# Patient Record
Sex: MALE | Race: WHITE | NOT HISPANIC OR LATINO | Employment: UNEMPLOYED | URBAN - NONMETROPOLITAN AREA
[De-identification: names, ages, dates, MRNs, and addresses within clinical notes are randomized per-mention and may not be internally consistent; named-entity substitution may affect disease eponyms.]

---

## 2017-01-19 ENCOUNTER — OFFICE VISIT (OUTPATIENT)
Dept: ORTHOPEDIC SURGERY | Facility: CLINIC | Age: 47
End: 2017-01-19

## 2017-01-19 VITALS
SYSTOLIC BLOOD PRESSURE: 124 MMHG | BODY MASS INDEX: 33.53 KG/M2 | DIASTOLIC BLOOD PRESSURE: 78 MMHG | WEIGHT: 253 LBS | HEIGHT: 73 IN

## 2017-01-19 DIAGNOSIS — S43.52XD ACROMIOCLAVICULAR SPRAIN, LEFT, SUBSEQUENT ENCOUNTER: Primary | ICD-10-CM

## 2017-01-19 PROCEDURE — 99213 OFFICE O/P EST LOW 20 MIN: CPT | Performed by: ORTHOPAEDIC SURGERY

## 2017-01-19 RX ORDER — MELOXICAM 15 MG/1
15 TABLET ORAL DAILY
Qty: 30 TABLET | Refills: 0 | Status: SHIPPED | OUTPATIENT
Start: 2017-01-19 | End: 2021-07-23

## 2017-01-19 NOTE — PROGRESS NOTES
Subjective   Jeremy Siu is a 46 y.o. male. 1970- 4 week recheck- left shoulder      History of Present Illness   Patient is here for a 4 week recheck of the left shoulder. Patient states that he has continued to have moderate pain to his left shoulder, but he does believe the kenalog/marcaine shot has relieved some of the pain. The patient has requested a refill request for Meloxicam 15mg to be sent to his pharmacy of choice. Due to the patient's daily obligations, the patient could not attend physical therapy sessions. The patient states that he does perform at home exercises to his left shoulder.     The following portions of the patient's history were reviewed and updated as appropriate:   He  has a past medical history of Diabetes mellitus due to underlying condition with diabetic neuropathic arthropathy; Diabetic neuropathy; Male erectile disorder; Myopia; Tobacco dependence syndrome; and Type 2 diabetes mellitus without complications.  He  does not have any pertinent problems on file.  He  has no past surgical history on file.  His Family history is unknown by patient.  He  reports that he has been smoking Cigarettes.  He has never used smokeless tobacco. He reports that he does not drink alcohol or use illicit drugs.  Current Outpatient Prescriptions   Medication Sig Dispense Refill   • amitriptyline (ELAVIL) 10 MG tablet Take 10 mg by mouth Every Night.     • aspirin 81 MG EC tablet Take 81 mg by mouth Daily.     • cyclobenzaprine (FLEXERIL) 10 MG tablet Take 1 tablet by mouth 3 (Three) Times a Day As Needed for muscle spasms. 90 tablet 3   • gabapentin (NEURONTIN) 300 MG capsule Take 300 mg by mouth 3 (Three) Times a Day. 1 CAP BID AND 2 CAPS BED     • linagliptin (TRADJENTA) 5 MG tablet tablet Take 5 mg by mouth Daily.     • meloxicam (MOBIC) 15 MG tablet Take 1 tablet by mouth Daily for 30 days. 30 tablet 0   • metFORMIN (GLUCOPHAGE) 1000 MG tablet Take 1,000 mg by mouth 2 (Two) Times a Day  With Meals.     • naproxen (NAPROSYN) 500 MG tablet Take 1 tablet by mouth 2 (Two) Times a Day With Meals. 60 tablet 1   • Omega-3 Fatty Acids (OMEGA 3 PO) Take  by mouth.     • pravastatin (PRAVACHOL) 20 MG tablet Take 20 mg by mouth Daily.     • sildenafil (VIAGRA) 25 MG tablet Take 25 mg by mouth Daily As Needed for erectile dysfunction.       No current facility-administered medications for this visit.      Current Outpatient Prescriptions on File Prior to Visit   Medication Sig   • amitriptyline (ELAVIL) 10 MG tablet Take 10 mg by mouth Every Night.   • aspirin 81 MG EC tablet Take 81 mg by mouth Daily.   • cyclobenzaprine (FLEXERIL) 10 MG tablet Take 1 tablet by mouth 3 (Three) Times a Day As Needed for muscle spasms.   • gabapentin (NEURONTIN) 300 MG capsule Take 300 mg by mouth 3 (Three) Times a Day. 1 CAP BID AND 2 CAPS BED   • linagliptin (TRADJENTA) 5 MG tablet tablet Take 5 mg by mouth Daily.   • metFORMIN (GLUCOPHAGE) 1000 MG tablet Take 1,000 mg by mouth 2 (Two) Times a Day With Meals.   • naproxen (NAPROSYN) 500 MG tablet Take 1 tablet by mouth 2 (Two) Times a Day With Meals.   • Omega-3 Fatty Acids (OMEGA 3 PO) Take  by mouth.   • pravastatin (PRAVACHOL) 20 MG tablet Take 20 mg by mouth Daily.   • sildenafil (VIAGRA) 25 MG tablet Take 25 mg by mouth Daily As Needed for erectile dysfunction.     No current facility-administered medications on file prior to visit.      He has No Known Allergies..    Review of Systems  REVIEW OF SYSTEMS:  Negative, other than presenting complaint.  HEENT: No headaches, diplopia, blurred vision, tinnitus, vertigo, epistaxis, hoarseness or sore throat.  Pulmonary: No cough, sputum, hemoptysis, dyspnea, wheezing, or chest pain.  Cardiac: No chest pain, palpitations, orthopnea, paroxysmal nocturnal dyspnea, shortness of breath, or pedal edema.  Gastrointestinal: No diarrhea, melena, or constipation.  Genitourinary: No dysuria, hematuria, nocturia, frequency, bladder or  "bowel incontinence.  Hematology: No history of any anemia, fatigue, fever, or chills or night sweats.  Dermatology: No rashes, pruritus, or increased pigmentation changes of the skin.     Objective   Physical Exam  Visit Vitals   • /78 (BP Location: Right arm, Patient Position: Sitting)   • Ht 73\" (185.4 cm)   • Wt 253 lb (115 kg)   • BMI 33.38 kg/m2       Social History     Social History   • Marital status:      Spouse name: N/A   • Number of children: N/A   • Years of education: N/A     Occupational History   • Not on file.     Social History Main Topics   • Smoking status: Current Every Day Smoker     Types: Cigarettes   • Smokeless tobacco: Never Used   • Alcohol use No   • Drug use: No   • Sexual activity: Yes     Other Topics Concern   • Not on file     Social History Narrative       HEENT: Normocephalic.  PERRLA.  TM's clear bilaterally.  Oropharynx: Clear.  Neck: Supple, with no adenopathy.  Chest: Equal bilateral expansion.  Clear to auscultation and percussion.  Heart: Regular sinus rhythm, S1 and S2 normal.  No murmurs or extra heart sounds heard.  Abdomen: Soft, nontender, and no organomegaly.  Neurological: cranial nerves II-XII normal Vascular: pulses are present  Dermatological: no rashes  or blemishes, or any abnormality of the skin.  Exam shows abduction to 130, internal rotation to L1 external rotation to 65 degrees. Biceps, triceps, supraspinatus all 4+/5.neurointact.    A prescription for Meloxicam 15mg will be sent to the patients pharmacy of choice. Pateint was instructed to continue to perform at home exercises to increase range of motion to the left shoulder.    Assessment/Plan impingement syndrome , improving , see back in 6 weeks.    Problems Addressed this Visit        Musculoskeletal and Integument    Acromioclavicular sprain - Primary    Relevant Medications    meloxicam (MOBIC) 15 MG tablet                 "

## 2017-01-19 NOTE — MR AVS SNAPSHOT
Jeremy Siu   1/19/2017 10:30 AM   Office Visit    Dept Phone:  [phone]   Encounter #:  [id number]    Provider:  Micheal Evans MD   Department:  [facility] ORTHOPEDICS                Your Full Care Plan              Today's Medication Changes          These changes are accurate as of: 1/19/17 10:38 AM.  If you have any questions, ask your nurse or doctor.               New Medication(s)Ordered:     meloxicam 15 MG tablet   Commonly known as:  MOBIC   Take 1 tablet by mouth Daily for 30 days.   Started by:  Micheal Evans MD            Where to Get Your Medications      These medications were sent to [facility], [facility] - [place], [place] - [address] - [phone]  - [phone] FX  [address] [facility] [address]     Phone:  [phone]     meloxicam 15 MG tablet                  Your Updated Medication List          This list is accurate as of: 1/19/17 10:38 AM.  Always use your most recent med list.                amitriptyline 10 MG tablet   Commonly known as:  ELAVIL       aspirin 81 MG EC tablet       cyclobenzaprine 10 MG tablet   Commonly known as:  FLEXERIL   Take 1 tablet by mouth 3 (Three) Times a Day As Needed for muscle spasms.       gabapentin 300 MG capsule   Commonly known as:  NEURONTIN       linagliptin 5 MG tablet tablet   Commonly known as:  TRADJENTA       meloxicam 15 MG tablet   Commonly known as:  MOBIC   Take 1 tablet by mouth Daily for 30 days.       metFORMIN 1000 MG tablet   Commonly known as:  GLUCOPHAGE       naproxen 500 MG tablet   Commonly known as:  NAPROSYN   Take 1 tablet by mouth 2 (Two) Times a Day With Meals.       OMEGA 3 PO       pravastatin 20 MG tablet   Commonly known as:  PRAVACHOL       VIAGRA 25 MG tablet   Generic drug:  sildenafil               You Were Diagnosed With        Codes Comments    Acromioclavicular sprain, left, subsequent encounter    -  Primary ICD-10-CM:  "S43.52XD  ICD-9-CM: V58.89, 840.0       Instructions     None    Patient Instructions History      Upcoming Appointments     Visit Type Date Time Department    FOLLOW UP 1/19/2017 10:30 AM [facility]    FOLLOW UP 3/16/2017  9:45 AM [facility] Sign[facility]     Our records indicate that you have declined [facility] signup. If you would like to sign up for [facility], please email [email or web address] or call [phone] to obtain an activation code.             Other Info from Your Visit           Your Appointments     Mar 16, 2017  9:45 AM CDT   Follow Up with Micheal Evans MD   [facility] ORTHOPEDICS (--)    [address]  [facility] [place] [address] [place] [id number] [phone]           Arrive 15 minutes prior to appointment.              Other Notes About Your Plan     Risk score 3    Blue team        Allergies     No Known Allergies      Reason for Visit     Left Shoulder - Follow-up           Vital Signs     Blood Pressure Height Weight Body Mass Index Smoking Status       124/78 (BP Location: Right arm, Patient Position: Sitting) 73\" (185.4 cm) 253 lb (115 kg) 33.38 kg/m2 Current Every Day Smoker       Problems and Diagnoses Noted     Sprain and strain of shoulder and upper arm        "

## 2017-03-03 RX ORDER — GABAPENTIN 300 MG/1
300 CAPSULE ORAL 3 TIMES DAILY
Qty: 90 CAPSULE | Refills: 5 | Status: SHIPPED | OUTPATIENT
Start: 2017-03-03 | End: 2020-06-24

## 2017-03-03 RX ORDER — GABAPENTIN 300 MG/1
300 CAPSULE ORAL 3 TIMES DAILY
Qty: 90 CAPSULE | Refills: 5 | Status: SHIPPED | OUTPATIENT
Start: 2017-03-03 | End: 2017-03-03 | Stop reason: SDUPTHER

## 2018-12-16 ENCOUNTER — HOSPITAL ENCOUNTER (EMERGENCY)
Facility: HOSPITAL | Age: 48
Discharge: HOME | End: 2018-12-16
Attending: EMERGENCY MEDICINE | Admitting: EMERGENCY MEDICINE

## 2018-12-16 ENCOUNTER — APPOINTMENT (OUTPATIENT)
Dept: GENERAL RADIOLOGY | Facility: HOSPITAL | Age: 48
End: 2018-12-16

## 2018-12-16 VITALS
HEIGHT: 73 IN | BODY MASS INDEX: 36.94 KG/M2 | WEIGHT: 278.7 LBS | TEMPERATURE: 98.2 F | DIASTOLIC BLOOD PRESSURE: 78 MMHG | HEART RATE: 70 BPM | SYSTOLIC BLOOD PRESSURE: 130 MMHG | RESPIRATION RATE: 20 BRPM | OXYGEN SATURATION: 93 %

## 2018-12-16 DIAGNOSIS — M25.511 ACUTE PAIN OF RIGHT SHOULDER: Primary | ICD-10-CM

## 2018-12-16 DIAGNOSIS — M25.561 ACUTE PAIN OF RIGHT KNEE: ICD-10-CM
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
## 2018-12-16 PROCEDURE — 73080 X-RAY EXAM OF ELBOW: CPT

## 2018-12-16 PROCEDURE — 73030 X-RAY EXAM OF SHOULDER: CPT

## 2018-12-16 PROCEDURE — 73090 X-RAY EXAM OF FOREARM: CPT

## 2018-12-16 PROCEDURE — 99283 EMERGENCY DEPT VISIT LOW MDM: CPT

## 2018-12-16 PROCEDURE — 73564 X-RAY EXAM KNEE 4 OR MORE: CPT

## 2018-12-16 RX ORDER — OMEPRAZOLE 20 MG/1
20 CAPSULE, DELAYED RELEASE ORAL DAILY
COMMUNITY

## 2018-12-16 RX ORDER — ATORVASTATIN CALCIUM 40 MG/1
40 TABLET, FILM COATED ORAL DAILY
COMMUNITY

## 2018-12-16 RX ORDER — OMEGA-3-ACID ETHYL ESTERS 1 G/1
2 CAPSULE, LIQUID FILLED ORAL 2 TIMES DAILY
COMMUNITY
End: 2020-06-24

## 2018-12-16 RX ORDER — GLIPIZIDE 10 MG/1
20 TABLET ORAL DAILY
COMMUNITY
End: 2020-07-08

## 2018-12-16 RX ORDER — LISINOPRIL 5 MG/1
5 TABLET ORAL DAILY
COMMUNITY

## 2018-12-17 ENCOUNTER — OFFICE VISIT (OUTPATIENT)
Dept: ORTHOPEDIC SURGERY | Facility: CLINIC | Age: 48
End: 2018-12-17

## 2018-12-17 VITALS — BODY MASS INDEX: 36.98 KG/M2 | HEIGHT: 73 IN | WEIGHT: 279 LBS

## 2018-12-17 DIAGNOSIS — S42.151A GLENOID FRACTURE OF SHOULDER, RIGHT, CLOSED, INITIAL ENCOUNTER: Primary | ICD-10-CM

## 2018-12-17 DIAGNOSIS — S42.141A GLENOID FRACTURE OF SHOULDER, RIGHT, CLOSED, INITIAL ENCOUNTER: Primary | ICD-10-CM

## 2018-12-17 DIAGNOSIS — M25.511 ACUTE PAIN OF RIGHT SHOULDER: ICD-10-CM
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
## 2018-12-17 DIAGNOSIS — S80.01XA CONTUSION OF RIGHT KNEE, INITIAL ENCOUNTER: ICD-10-CM

## 2018-12-17 PROCEDURE — 99214 OFFICE O/P EST MOD 30 MIN: CPT | Performed by: NURSE PRACTITIONER

## 2018-12-17 PROCEDURE — 23570 CLTX SCAPULAR FX W/O MNPJ: CPT | Performed by: NURSE PRACTITIONER

## 2018-12-17 RX ORDER — IBUPROFEN 800 MG/1
800 TABLET ORAL EVERY 8 HOURS PRN
Qty: 90 TABLET | Refills: 1 | Status: SHIPPED | OUTPATIENT
Start: 2018-12-17 | End: 2019-01-16

## 2018-12-17 RX ORDER — TRAMADOL HYDROCHLORIDE 50 MG/1
TABLET ORAL
Qty: 40 TABLET | Refills: 0 | Status: SHIPPED | OUTPATIENT
Start: 2018-12-17 | End: 2019-05-06

## 2018-12-17 RX ORDER — AMITRIPTYLINE HYDROCHLORIDE 10 MG/1
10 TABLET, FILM COATED ORAL NIGHTLY
COMMUNITY

## 2018-12-17 NOTE — PROGRESS NOTES
Jeremy Siu is a 48 y.o. male   Primary provider:  Gina Castillo APRN       Chief Complaint   Patient presents with   • Right Shoulder - Shoulder Pain       HISTORY OF PRESENT ILLNESS:    48-year-old  male who is a  at a local apartments fell into a manhole while walking in the [facility] on December 16, 2018.  He was evaluated by her local urgent care/ER and referred to us for further evaluations of a avulsion fracture of the glenoid of the right shoulder.  He's also complaining of right knee and arm pain after the incident.  He was evaluated and released placed in a sling for the injury of the right shoulder and given medication for his discomfort.  He reports continued pain in the right shoulder without improvement despite use of the sling and anti-inflammatories/pain medication.    Arm Injury    The incident occurred 12 to 24 hours ago. The injury mechanism was a fall. The pain is present in the right shoulder. The quality of the pain is described as aching and stabbing. The pain is severe. The pain has been constant since the incident. Associated symptoms comments: Swelling. .        CONCURRENT MEDICAL HISTORY:    Past Medical History:   Diagnosis Date   • Diabetes mellitus due to underlying condition with diabetic neuropathic arthropathy (CMS/HCC)    • Diabetic neuropathy (CMS/HCC)    • Male erectile disorder    • Myopia    • Tobacco dependence syndrome    • Type 2 diabetes mellitus without complications (CMS/HCC)        No Known Allergies      Current Outpatient Medications:   •  amitriptyline (ELAVIL) 10 MG tablet, Take 10 mg by mouth Every Night., Disp: , Rfl:   •  aspirin 81 MG EC tablet, Take 81 mg by mouth Daily., Disp: , Rfl:   •  atorvastatin (LIPITOR) 40 MG tablet, Take 40 mg by mouth Daily., Disp: , Rfl:   •  cyclobenzaprine (FLEXERIL) 10 MG tablet, Take 1 tablet by mouth 3 (Three) Times a Day As Needed for muscle spasms., Disp: 90 tablet, Rfl: 3  •   gabapentin (NEURONTIN) 300 MG capsule, Take 1 capsule by mouth 3 (Three) Times a Day. 1 CAP IN THE MORNING AND 2 CAPS BED (Patient taking differently: Take 600 mg by mouth 3 (Three) Times a Day. 1 CAP IN THE MORNING AND 2 CAPS BED), Disp: 90 capsule, Rfl: 5  •  glipiZIDE (GLUCOTROL) 10 MG tablet, Take 10 mg by mouth Daily., Disp: , Rfl:   •  linagliptin (TRADJENTA) 5 MG tablet tablet, Take 1 tablet by mouth Daily., Disp: 30 tablet, Rfl: 5  •  lisinopril (PRINIVIL,ZESTRIL) 5 MG tablet, Take 5 mg by mouth Daily., Disp: , Rfl:   •  metFORMIN (GLUCOPHAGE) 1000 MG tablet, Take 1 tablet by mouth 2 (Two) Times a Day With Meals., Disp: 60 tablet, Rfl: 5  •  omega-3 acid ethyl esters (LOVAZA) 1 g capsule, Take 2 g by mouth 2 (Two) Times a Day., Disp: , Rfl:   •  Omega-3 Fatty Acids (OMEGA 3 PO), Take  by mouth., Disp: , Rfl:   •  omeprazole (priLOSEC) 20 MG capsule, Take 20 mg by mouth Daily., Disp: , Rfl:   •  pravastatin (PRAVACHOL) 20 MG tablet, Take 20 mg by mouth Daily., Disp: , Rfl:   •  ibuprofen (ADVIL,MOTRIN) 800 MG tablet, Take 1 tablet by mouth Every 8 (Eight) Hours As Needed for Mild Pain  for up to 30 days., Disp: 90 tablet, Rfl: 1  •  meloxicam (MOBIC) 15 MG tablet, Take 1 tablet by mouth Daily for 30 days., Disp: 30 tablet, Rfl: 0  •  traMADol (ULTRAM) 50 MG tablet, 1-2 tabs po q eight hour prn pain, Disp: 40 tablet, Rfl: 0    No past surgical history on file.    Family History   Family history unknown: Yes        Social History     Socioeconomic History   • Marital status:      Spouse name: Not on file   • Number of children: Not on file   • Years of education: Not on file   • Highest education level: Not on file   Social Needs   • Financial resource strain: Not on file   • Food insecurity - worry: Not on file   • Food insecurity - inability: Not on file   • Transportation needs - medical: Not on file   • Transportation needs - non-medical: Not on file   Occupational History   • Not on file   Tobacco  "Use   • Smoking status: Current Every Day Smoker     Types: Cigarettes   • Smokeless tobacco: Never Used   Substance and Sexual Activity   • Alcohol use: No   • Drug use: No   • Sexual activity: Yes   Other Topics Concern   • Not on file   Social History Narrative   • Not on file        Review of Systems   Psychiatric/Behavioral: Positive for sleep disturbance.   All other systems reviewed and are negative.      PHYSICAL EXAMINATION:       Ht 185.4 cm (73\")   Wt 127 kg (279 lb)   BMI 36.81 kg/m²     Physical Exam   Constitutional: He is oriented to person, place, and time. Vital signs are normal. He appears well-developed and well-nourished. He is cooperative.   HENT:   Head: Normocephalic and atraumatic.   Neck: Trachea normal and phonation normal.   Pulmonary/Chest: Effort normal. No respiratory distress.   Abdominal: Soft. Normal appearance. He exhibits no distension.   Musculoskeletal:        Right knee: He exhibits no effusion.   Neurological: He is alert and oriented to person, place, and time. GCS eye subscore is 4. GCS verbal subscore is 5. GCS motor subscore is 6.   Skin: Skin is warm, dry and intact. Capillary refill takes less than 2 seconds.   Psychiatric: He has a normal mood and affect. His speech is normal and behavior is normal. Judgment and thought content normal. Cognition and memory are normal.   Vitals reviewed.      GAIT:     [x]  Normal  []  Antalgic    Assistive device: [x]  None  []  Walker     []  Crutches  []  Cane     []  Wheelchair  []  Stretcher    Right Knee Exam     Tenderness   The patient is experiencing tenderness in the patellar tendon, medial joint line and lateral joint line.    Range of Motion   Extension: normal   Flexion: normal     Other   Erythema: absent  Sensation: normal  Pulse: present  Swelling: mild  Effusion: no effusion present    Comments:  Small abrasion noted anterior knee no evidence of infection      Left Knee Exam   Left knee exam is normal.    Muscle " Strength   The patient has normal left knee strength.      Right Shoulder Exam     Tenderness   Right shoulder tenderness location: Diffuse pain.    Other   Erythema: absent  Scars: absent  Sensation: normal  Pulse: present    Comments:  Range of motion and strength testing deferred due to the glenoid fracture.      Left Shoulder Exam   Left shoulder exam is normal.              Xr Shoulder 2+ View Right    Result Date: 12/16/2018  Narrative: Three view right shoulder HISTORY: Right shoulder pain after falling AP films with the humerus in internal and external rotation and scapular Y view were obtained. COMPARISON: None 3 mm avulsion fracture adjacent to the inferior aspect of the glenoid, of uncertain age. Minimal hypertrophic change acromioclavicular joint. No dislocation. Degenerative changes thoracic spine. No other osseous or articular abnormality.     Impression: CONCLUSION: 3 mm avulsion fracture adjacent to the inferior aspect of the glenoid, of uncertain age. Minimal hypertrophic change acromioclavicular joint. [id number] Electronically signed by:  Don Torres MD  12/16/2018 8:58 PM CST Workstation: [id number]    Xr Elbow 3+ View Right    Result Date: 12/16/2018  Narrative: Three view right elbow HISTORY: Pain after falling AP, lateral and oblique views obtained. COMPARISON: None No fracture or dislocation. Small to moderate-sized olecranon spur. No joint effusion. No other osseous or articular abnormality.     Impression: CONCLUSION: No fracture or dislocation. Small to moderate-sized olecranon spur. No joint effusion. [id number] Electronically signed by:  Don Torres MD  12/16/2018 8:52 PM CST Workstation: [id number]    Xr Forearm 2 View Right    Result Date: 12/16/2018  Narrative: Two view right forearm HISTORY: Pain after falling AP and lateral views obtained. COMPARISON: None No fracture or dislocation. No other osseous or articular abnormality.     Impression: CONCLUSION: No fracture or dislocation [id number]  Electronically signed by:  Don Torres MD  12/16/2018 8:51 PM CST Workstation: [id number]    Xr Knee 4+ View Right    Result Date: 12/16/2018  Narrative: Four view right knee HISTORY: Pain after falling AP, lateral, tunnel and oblique views obtained. COMPARISON: None No fracture or dislocation. Patellar and femoral osteophytes. Minimal narrowing of the medial joint space. Small suprapatellar effusion No other osseous or articular abnormality.     Impression: CONCLUSION: No fracture or dislocation. Patellar and femoral osteophytes. Minimal narrowing of the medial joint space. Small suprapatellar effusion [id number] Electronically signed by:  Don Torres MD  12/16/2018 8:55 PM CST Workstation: [id number]          ASSESSMENT:    Diagnoses and all orders for this visit:    Glenoid fracture of shoulder, right, closed, initial encounter  -     MRI Shoulder Right Without Contrast; Future    Acute pain of right shoulder    Fall, initial encounter    Contusion of right knee, initial encounter    Other orders  -     amitriptyline (ELAVIL) 10 MG tablet; Take 10 mg by mouth Every Night.  -     traMADol (ULTRAM) 50 MG tablet; 1-2 tabs po q eight hour prn pain  -     ibuprofen (ADVIL,MOTRIN) 800 MG tablet; Take 1 tablet by mouth Every 8 (Eight) Hours As Needed for Mild Pain  for up to 30 days.          PLAN  Reviewed the x-rays with the patient is wife recommending an MRI of the right shoulder and follow-up with Dr. Aleman discussed findings.  Patient was given a prescription for ibuprofen to take 3 times a day along with tramadol for pain relief, instructed to continue use the sling and he will need to be off work until follow-up with Dr. Aleman.  No Follow-up on file.    Efrain Carpenter, APRN

## 2018-12-21 NOTE — ED PROVIDER NOTES
Subjective     History provided by:  Spouse   used: No    Fall   Mechanism of injury: fall    Injury location:  Shoulder/arm and leg  Shoulder/arm injury location:  R shoulder  Leg injury location:  R knee  Incident location:  Outdoors (Patient fell in a man whole that was uncovered.)  Time since incident:  2 hours  Arrived directly from scene: yes    Fall:     Fall occurred: fell in a hole up to his shoulders. Denies hitting his head.    Point of impact:  Unable to specify  Suspicion of alcohol use: no    Suspicion of drug use: no    Tetanus status:  Up to date  Prior to arrival data:     Bystander interventions:  None    Patient ambulatory at scene: yes      Blood loss:  None    Responsiveness at scene:  Alert    Orientation at scene:  Person, place, situation and time    Loss of consciousness: no      Amnesic to event: no      Airway interventions:  None    Breathing interventions:  None  Associated symptoms: no abdominal pain, no back pain, no blindness, no chest pain, no difficulty breathing, no headaches, no hearing loss, no loss of consciousness, no nausea, no neck pain, no seizures and no vomiting    Risk factors: diabetes    Risk factors: no AICD, no anticoagulation therapy, no asthma, no beta blocker therapy, no CABG, no CAD, no CHF, no COPD, no dialysis, no hemophilia, no kidney disease, no pacemaker, no past MI, not pregnant and no steroid use        Review of Systems   Constitutional: Negative.    HENT: Negative.  Negative for hearing loss.    Eyes: Negative.  Negative for blindness.   Respiratory: Negative.    Cardiovascular: Negative.  Negative for chest pain.   Gastrointestinal: Negative.  Negative for abdominal pain, nausea and vomiting.   Endocrine: Negative.    Genitourinary: Negative.    Musculoskeletal: Negative for arthralgias, back pain, gait problem, joint swelling, myalgias, neck pain and neck stiffness.        Pain in right shoulder, elbow, and knee.   Skin: Negative.     Allergic/Immunologic: Negative.    Neurological: Negative.  Negative for seizures, loss of consciousness and headaches.   Hematological: Negative.    Psychiatric/Behavioral: Negative.        Past Medical History:   Diagnosis Date   • Diabetes mellitus due to underlying condition with diabetic neuropathic arthropathy (CMS/HCC)    • Diabetic neuropathy (CMS/HCC)    • Male erectile disorder    • Myopia    • Tobacco dependence syndrome    • Type 2 diabetes mellitus without complications (CMS/HCC)        No Known Allergies    History reviewed. No pertinent surgical history.    Family History   Family history unknown: Yes       Social History     Socioeconomic History   • Marital status:      Spouse name: Not on file   • Number of children: Not on file   • Years of education: Not on file   • Highest education level: Not on file   Tobacco Use   • Smoking status: Current Every Day Smoker     Types: Cigarettes   • Smokeless tobacco: Never Used   Substance and Sexual Activity   • Alcohol use: No   • Drug use: No   • Sexual activity: Yes           Objective   Physical Exam   Constitutional: He is oriented to person, place, and time. He appears well-developed and well-nourished. No distress.   HENT:   Head: Normocephalic and atraumatic.   Mouth/Throat: No oropharyngeal exudate.   Eyes: Conjunctivae and EOM are normal. Pupils are equal, round, and reactive to light. Right eye exhibits no discharge. Left eye exhibits no discharge. No scleral icterus.   Neck: Normal range of motion. Neck supple. No JVD present. No tracheal deviation present. No thyromegaly present.   Cardiovascular: Normal rate, regular rhythm, normal heart sounds and intact distal pulses. Exam reveals no gallop and no friction rub.   No murmur heard.  Pulmonary/Chest: Effort normal and breath sounds normal. No stridor. No respiratory distress. He has no wheezes. He has no rales. He exhibits no tenderness.   Abdominal: Soft. Bowel sounds are normal. He  exhibits no distension and no mass. There is no tenderness. There is no rebound and no guarding. No hernia.   Musculoskeletal: He exhibits tenderness. He exhibits no edema or deformity.        Right shoulder: He exhibits decreased range of motion, tenderness, bony tenderness and pain.        Right knee: He exhibits normal range of motion, no swelling, no effusion, no ecchymosis, no deformity, no laceration, no erythema, normal alignment, no LCL laxity, normal patellar mobility, no bony tenderness, normal meniscus and no MCL laxity.   Lymphadenopathy:     He has no cervical adenopathy.   Neurological: He is alert and oriented to person, place, and time. He has normal reflexes. He displays normal reflexes. No cranial nerve deficit or sensory deficit. He exhibits normal muscle tone. Coordination normal.   Skin: Skin is warm and dry. Capillary refill takes less than 2 seconds. No rash noted. He is not diaphoretic. No erythema. No pallor.   Psychiatric: He has a normal mood and affect. His behavior is normal. Judgment and thought content normal.   Nursing note and vitals reviewed.      Procedures           ED Course        Xr Shoulder 2+ View Right    Result Date: 12/16/2018  Narrative: Three view right shoulder HISTORY: Right shoulder pain after falling AP films with the humerus in internal and external rotation and scapular Y view were obtained. COMPARISON: None 3 mm avulsion fracture adjacent to the inferior aspect of the glenoid, of uncertain age. Minimal hypertrophic change acromioclavicular joint. No dislocation. Degenerative changes thoracic spine. No other osseous or articular abnormality.     Impression: CONCLUSION: 3 mm avulsion fracture adjacent to the inferior aspect of the glenoid, of uncertain age. Minimal hypertrophic change acromioclavicular joint. [id number] Electronically signed by:  Don Torres MD  12/16/2018 8:58 PM [facility] Workstation: [id number]    Xr Elbow 3+ View Right    Result Date: 12/16/2018  Narrative:  Three view right elbow HISTORY: Pain after falling AP, lateral and oblique views obtained. COMPARISON: None No fracture or dislocation. Small to moderate-sized olecranon spur. No joint effusion. No other osseous or articular abnormality.     Impression: CONCLUSION: No fracture or dislocation. Small to moderate-sized olecranon spur. No joint effusion. [id number] Electronically signed by:  Don Torres MD  12/16/2018 8:52 PM CST Workstation: [id number]    Xr Forearm 2 View Right    Result Date: 12/16/2018  Narrative: Two view right forearm HISTORY: Pain after falling AP and lateral views obtained. COMPARISON: None No fracture or dislocation. No other osseous or articular abnormality.     Impression: CONCLUSION: No fracture or dislocation [id number] Electronically signed by:  Don Torres MD  12/16/2018 8:51 PM CST Workstation: [id number]    Xr Knee 4+ View Right    Result Date: 12/16/2018  Narrative: Four view right knee HISTORY: Pain after falling AP, lateral, tunnel and oblique views obtained. COMPARISON: None No fracture or dislocation. Patellar and femoral osteophytes. Minimal narrowing of the medial joint space. Small suprapatellar effusion No other osseous or articular abnormality.     Impression: CONCLUSION: No fracture or dislocation. Patellar and femoral osteophytes. Minimal narrowing of the medial joint space. Small suprapatellar effusion [id number] Electronically signed by:  Don Torres MD  12/16/2018 8:55 PM CST Workstation: [id number]    Patient placed in sling and provided with pain medication. Will refer to Orthopedic for further follow up on shoulder fracture. Patient verbalizes understanding and agrees with treatment plan.           MDM      Final diagnoses:   Acute pain of right shoulder   Acute pain of right knee   Fall, initial encounter            Kylie Sheikh PA  12/21/18 0953

## 2018-12-26 DIAGNOSIS — M25.511 CHRONIC RIGHT SHOULDER PAIN: Primary | ICD-10-CM

## 2018-12-26 DIAGNOSIS — G89.29 CHRONIC RIGHT SHOULDER PAIN: Primary | ICD-10-CM

## 2018-12-26 DIAGNOSIS — M75.41 SHOULDER IMPINGEMENT SYNDROME, RIGHT: ICD-10-CM

## 2019-01-02 ENCOUNTER — OFFICE VISIT (OUTPATIENT)
Dept: ORTHOPEDIC SURGERY | Facility: CLINIC | Age: 49
End: 2019-01-02

## 2019-01-02 VITALS — BODY MASS INDEX: 36.98 KG/M2 | HEIGHT: 73 IN | WEIGHT: 279 LBS

## 2019-01-02 DIAGNOSIS — M25.511 ACUTE PAIN OF RIGHT SHOULDER: ICD-10-CM

## 2019-01-02 DIAGNOSIS — S46.001D INJURY OF RIGHT ROTATOR CUFF, SUBSEQUENT ENCOUNTER: Primary | ICD-10-CM

## 2019-01-02 PROCEDURE — 99214 OFFICE O/P EST MOD 30 MIN: CPT | Performed by: ORTHOPAEDIC SURGERY

## 2019-01-02 NOTE — PROGRESS NOTES
"Jeremy Siu is a 48 y.o. male returns for     Chief Complaint   Patient presents with   • Right Shoulder - Pain       HISTORY OF PRESENT ILLNESS: Patient is here today for right shoulder pain. Patient was seen by CHUN Martin for this issue. He is a  at a local apartment and and fell into a manhole while walking in the city on 12-.  He has also been seen in the emergency room Subsequent to that the same problem.  No history of prior injury to the right shoulder.  He's had a history of rotator cuff issues on the left in the past treated by Dr. Evans.  Pain was immediate and has been persistent at this point.  It radiates down his arm to his elbow..  He has had xrays and is being treated for an avulsion fracture of the glenoid of the right shoulder. He was sent for an MRI and it was denied. Patient states that his pain is 7/10.       CONCURRENT MEDICAL HISTORY:    The following portions of the patient's history were reviewed and updated as appropriate: allergies, current medications, past family history, past medical history, past social history, past surgical history and problem list.     ROS  No fevers or chills.  No chest pain or shortness of air.  No GI or  disturbances.    PHYSICAL EXAMINATION:       Ht 185.4 cm (73\")   Wt 127 kg (279 lb)   BMI 36.81 kg/m²     Physical Exam   Constitutional: He is oriented to person, place, and time. He appears well-developed.   HENT:   Head: Normocephalic and atraumatic.   Eyes: EOM are normal. Pupils are equal, round, and reactive to light.   Neck: Neck supple. No tracheal deviation present.   Pulmonary/Chest: Effort normal.   Musculoskeletal: He exhibits tenderness. He exhibits no edema or deformity.   Neurological: He is alert and oriented to person, place, and time.   Skin: Skin is warm and dry. No erythema.   Psychiatric: He has a normal mood and affect.   Vitals reviewed.      GAIT:     [x]  Normal  []  Antalgic    Assistive " device: [x]  None  []  Walker     []  Crutches  []  Cane     []  Wheelchair  []  Stretcher    Ortho Exam  Passive motion nonoperatively painful.  Not tender over the AC joint.  Neurologically intact distally.  Elbow is nontender.  Pain with mild weakness with resistance of the infraspinatus.  Certainly positive impingement.  Supraspinatus also appears weak.  Subscap is painful but with reasonable strength here.    Xr Shoulder 2+ View Right    Result Date: 12/16/2018  Narrative: Three view right shoulder HISTORY: Right shoulder pain after falling AP films with the humerus in internal and external rotation and scapular Y view were obtained. COMPARISON: None 3 mm avulsion fracture adjacent to the inferior aspect of the glenoid, of uncertain age. Minimal hypertrophic change acromioclavicular joint. No dislocation. Degenerative changes thoracic spine. No other osseous or articular abnormality.     Impression: CONCLUSION: 3 mm avulsion fracture adjacent to the inferior aspect of the glenoid, of uncertain age. Minimal hypertrophic change acromioclavicular joint. [id number] Electronically signed by:  Don Torres MD  12/16/2018 8:58 PM CST Workstation: [id number]    Xr Elbow 3+ View Right    Result Date: 12/16/2018  Narrative: Three view right elbow HISTORY: Pain after falling AP, lateral and oblique views obtained. COMPARISON: None No fracture or dislocation. Small to moderate-sized olecranon spur. No joint effusion. No other osseous or articular abnormality.     Impression: CONCLUSION: No fracture or dislocation. Small to moderate-sized olecranon spur. No joint effusion. [id number] Electronically signed by:  Don Torres MD  12/16/2018 8:52 PM CST Workstation: [id number]    Xr Forearm 2 View Right    Result Date: 12/16/2018  Narrative: Two view right forearm HISTORY: Pain after falling AP and lateral views obtained. COMPARISON: None No fracture or dislocation. No other osseous or articular abnormality.     Impression: CONCLUSION: No  fracture or dislocation [id number] Electronically signed by:  Don Torres MD  12/16/2018 8:51 PM CST Workstation: [id number]    Xr Knee 4+ View Right    Result Date: 12/16/2018  Narrative: Four view right knee HISTORY: Pain after falling AP, lateral, tunnel and oblique views obtained. COMPARISON: None No fracture or dislocation. Patellar and femoral osteophytes. Minimal narrowing of the medial joint space. Small suprapatellar effusion No other osseous or articular abnormality.     Impression: CONCLUSION: No fracture or dislocation. Patellar and femoral osteophytes. Minimal narrowing of the medial joint space. Small suprapatellar effusion [id number] Electronically signed by:  Don Torres MD  12/16/2018 8:55 PM CST Workstation: [id number]            ASSESSMENT:    Diagnoses and all orders for this visit:    Injury of right rotator cuff, subsequent encounter  -     MRI shoulder right wo contrast; Future    Acute pain of right shoulder  -     MRI shoulder right wo contrast; Future          PLAN this patient had an injury with immediate pain in his right shoulder.  The concern here is traumatic rotator cuff tear.  This is an issue that in my opinion should not be treated with injection but the patient needs MRI scan to evaluate the status of the cuff.  Certainly he has a full-thickness tear that is traumatic in each to be fixed.  If he has a partial tear then physical therapy is appropriate treatment.  This is not a degenerative tear this is a traumatic tear we are concerned about with a significant injury catching all of his weight on his right arm after falling through a manhole cover.  The extent of the tear dictates the course of treatment, therefore MRI scan is indicated clinically at this point and medically necessary.    No Follow-up on file.    Jaden Aleman MD

## 2019-01-03 ENCOUNTER — HOSPITAL ENCOUNTER (OUTPATIENT)
Dept: PHYSICAL THERAPY | Facility: HOSPITAL | Age: 49
Setting detail: THERAPIES SERIES
Discharge: HOME | End: 2019-01-03

## 2019-01-03 DIAGNOSIS — M75.41 SHOULDER IMPINGEMENT SYNDROME, RIGHT: ICD-10-CM

## 2019-01-03 DIAGNOSIS — M25.511 CHRONIC RIGHT SHOULDER PAIN: Primary | ICD-10-CM

## 2019-01-03 DIAGNOSIS — G89.29 CHRONIC RIGHT SHOULDER PAIN: Primary | ICD-10-CM

## 2019-01-03 PROCEDURE — 97161 PT EVAL LOW COMPLEX 20 MIN: CPT

## 2019-01-03 NOTE — THERAPY EVALUATION
Outpatient Physical Therapy Ortho Initial Evaluation  [facility]     Patient Name: Jeremy Siu  : 1970  MRN: [id number]  Today's Date: 1/3/2019      Visit Date: 2019    Patient Active Problem List   Diagnosis   • Acromioclavicular sprain   • Cigarette smoker   • Type 2 diabetes mellitus without complication, without long-term current use of insulin (CMS/HCC)   • Rotator cuff injury   • Chronic left shoulder pain   • Acute pain of right shoulder        Past Medical History:   Diagnosis Date   • Diabetes mellitus due to underlying condition with diabetic neuropathic arthropathy (CMS/HCC)    • Diabetic neuropathy (CMS/HCC)    • Male erectile disorder    • Myopia    • Tobacco dependence syndrome    • Type 2 diabetes mellitus without complications (CMS/HCC)         No past surgical history on file.    Visit Dx:     ICD-10-CM ICD-9-CM   1. Chronic right shoulder pain M25.511 719.41    G89.29 338.29   2. Shoulder impingement syndrome, right M75.41 726.2       Patient History     Row Name 19 1300             History    Chief Complaint  Pain  -MW      Brief Description of Current Complaint  The pt was out walking in the evening in [place], [place] when injury occurred. He states he didn't see a man hole that was open in the road. He states that he stepped in the hole and fell down waist high. He was able to catch himself w/ both arms w/ the R arm taking more force. He states that he went to the ER at that time. He states that since then he has followed up w/ his referring MD and they are predicting he may need shoulder surgery pending the MRI results.   -MW      Smoking Status  current smoker  -MW      Hand Dominance  right-handed  -MW      Occupation/sports/leisure activities   for [employer]-- unable to work currently  -MW      What clinical tests have you had for this problem?  X-ray  -MW         Pain     Pain Location  Shoulder  -MW      Pain at Present  6   -MW      Pain at Best  6  -MW      Pain at Worst  10  -MW      Pain Frequency  Intermittent  -MW      Pain Description  Aching;Dull  -MW      What Performance Factors Make the Current Problem(s) BETTER?  ibuprofen, Tramidol  -MW      Is your sleep disturbed?  Yes  -MW         Daily Activities    Primary Language  English  -MW      Pt Participated in POC and Goals  Yes  -MW        User Key  (r) = Recorded By, (t) = Taken By, (c) = Cosigned By    Initials Name Provider Type    MW Amirah Hong, PT Physical Therapist          PT Ortho     Row Name 01/03/19 1300       Subjective Comments    Subjective Comments  See hx  -MW       Precautions and Contraindications    Precautions  DM 2  -MW       Posture/Observations    Posture/Observations Comments  The pt has severe TTP along his R LH biceps insertion and at distal supraspinatus attachment. The pt has 5/5 biceps strength on L UE but resisted R elbow flexion causes severe pain in the R biceps and pt is unable to tolerate resistance.  Pt is wearing a sling that was provided to him at ER.   -MW       General ROM    GENERAL ROM COMMENTS  Severe pain at all end range of motion. Pt had severe guarding w/ attempted PROM.  -MW       Right Upper Ext    Rt Shoulder Abduction AROM  56  -MW    Rt Shoulder Abduction PROM  65  -MW    Rt Shoulder Flexion AROM  73  -MW    Rt Shoulder Flexion PROM  75  -MW    Rt Shoulder External Rotation AROM  to ear  -MW    Rt Shoulder External Rotation PROM  30  -MW    Rt Shoulder Internal Rotation AROM  PSIS  -MW       MMT (Manual Muscle Testing)    General MMT Comments  MMT deferred  -MW      User Key  (r) = Recorded By, (t) = Taken By, (c) = Cosigned By    Initials Name Provider Type    Amirah Little, PT Physical Therapist                            PT OP Goals     Row Name 01/03/19 1439 01/03/19 1400       PT Short Term Goals    STG 1  --  Goals deferred at this time  -MW       Time Calculation    PT Goal Re-Cert Due Date  01/24/19  -MW   --      User Key  (r) = Recorded By, (t) = Taken By, (c) = Cosigned By    Initials Name Provider Type    Amirah Little, PT Physical Therapist          PT Assessment/Plan     Row Name 01/03/19 1400          PT Assessment    Assessment Comments  The pt was evaluated today for R shoulder pain secondary to a traumatic incident. He demonstrates signs/sxs consistent w/ possible RTC tear and possible biceps tendon tear/involvement of the R shoulder. R shoulder capsular damage cannot be ruled out at this time. The pt has limited insurance coverage for the year for therapy services. If the pt elects to have shoulder surgery he will require therapy afterwards; therefore, this particular case calls for being conservative w/ therapy tx at this time. Based on objective measures taken today and the pt's poor tolerance to ROM I do not anticipate he will do well w/ rehab. The pt has not had an MRI to date and it is my impression he is in high need of an MRI at this time for pathological diagnosis. Holding therapy at this time secondary to pt's needs. Will adjust the pt's POC accordingly pending his MRI results and MD recommendation.     -MW        PT Plan    PT Plan Comments  Holding PT at this time  -MW       User Key  (r) = Recorded By, (t) = Taken By, (c) = Cosigned By    Initials Name Provider Type    Amirah Little, PT Physical Therapist          Modalities     Row Name 01/03/19 1300             Ice    Ice Applied  Yes  -MW      Location  R shoulder  -MW      Rx Minutes  15 mins  -MW      Ice S/P Rx  Yes  -MW        User Key  (r) = Recorded By, (t) = Taken By, (c) = Cosigned By    Initials Name Provider Type    Amirah Little PT Physical Therapist        Exercises     Row Name 01/03/19 1300             Subjective Comments    Subjective Comments  See hx  -MW        User Key  (r) = Recorded By, (t) = Taken By, (c) = Cosigned By    Initials Name Provider Type    Amirah Little, PT Physical Therapist                         Outcome Measure Options: Quick DASH  Quick DASH  Open a tight or new jar.: Unable  Do heavy household chores (e.g., wash walls, wash floors): Unable  Carry a shopping bag or briefcase: Unable  Wash your back: Unable  Use a knife to cut food: Unable  Recreational activities in which you take some force or impact through your arm, should or hand (e.g. golf, hammering, tennis, etc.): Unable  During the past week, to what extent has your arm, shoulder, or hand problem interfered with your normal social activites with family, friends, neighbors or groups?: Extremely  During the past week, were you limited in your work or other regular daily activities as a result of your arm, shoulder or hand problem?: Unable  Arm, Shoulder, or hand pain: Moderate  Tingling (pins and needles) in your arm, shoulder, or hand: None  During the past week, how much difficulty have you had sleeping because of the pain in your arm, shoulder or hand?: Severe Difficulty  Number of Questions Answered: 11  Quick DASH Score: 84.09         Time Calculation:         Start Time: 1345  Stop Time: 1429  Time Calculation (min): 44 min     Therapy Charges for Today     Code Description Service Date Service Provider Modifiers Qty    05851039369 HC PT EVAL LOW COMPLEXITY 3 1/3/2019 Amirah Hong, PT GP 1    33527613224 HC PT THER SUPP EA 15 MIN 1/3/2019 Amirah Hong, PT GP 1                   Amirah Hong PT  1/3/2019

## 2019-02-28 ENCOUNTER — HOSPITAL ENCOUNTER (OUTPATIENT)
Dept: MRI IMAGING | Facility: HOSPITAL | Age: 49
Discharge: HOME | End: 2019-02-28
Admitting: ORTHOPAEDIC SURGERY

## 2019-02-28 DIAGNOSIS — M25.511 ACUTE PAIN OF RIGHT SHOULDER: ICD-10-CM

## 2019-02-28 DIAGNOSIS — S46.001D INJURY OF RIGHT ROTATOR CUFF, SUBSEQUENT ENCOUNTER: ICD-10-CM

## 2019-02-28 PROCEDURE — 73221 MRI JOINT UPR EXTREM W/O DYE: CPT

## 2019-03-04 ENCOUNTER — OFFICE VISIT (OUTPATIENT)
Dept: ORTHOPEDIC SURGERY | Facility: CLINIC | Age: 49
End: 2019-03-04

## 2019-03-04 VITALS — BODY MASS INDEX: 37.25 KG/M2 | HEIGHT: 73 IN | WEIGHT: 281.1 LBS

## 2019-03-04 DIAGNOSIS — M75.41 SHOULDER IMPINGEMENT SYNDROME, RIGHT: ICD-10-CM

## 2019-03-04 DIAGNOSIS — S46.001D INJURY OF RIGHT ROTATOR CUFF, SUBSEQUENT ENCOUNTER: Primary | ICD-10-CM

## 2019-03-04 DIAGNOSIS — M25.511 CHRONIC RIGHT SHOULDER PAIN: ICD-10-CM

## 2019-03-04 DIAGNOSIS — G89.29 CHRONIC RIGHT SHOULDER PAIN: ICD-10-CM

## 2019-03-04 DIAGNOSIS — M25.511 ACUTE PAIN OF RIGHT SHOULDER: ICD-10-CM

## 2019-03-04 DIAGNOSIS — S42.254D CLOSED NONDISPLACED FRACTURE OF GREATER TUBEROSITY OF RIGHT HUMERUS WITH ROUTINE HEALING, SUBSEQUENT ENCOUNTER: ICD-10-CM

## 2019-03-04 PROCEDURE — 99024 POSTOP FOLLOW-UP VISIT: CPT | Performed by: ORTHOPAEDIC SURGERY

## 2019-03-04 NOTE — PROGRESS NOTES
Jeremy Siu is a 48 y.o. male returns for     Chief Complaint   Patient presents with   • Right Shoulder - Follow-up   • Results     MRI       HISTORY OF PRESENT ILLNESS: Patient being seen for right shoulder follow up. MRI done at , would like to discuss findings.  Still having pain feels like he is not much better he is about 2 months out from his injury.       CONCURRENT MEDICAL HISTORY:    Past Medical History:   Diagnosis Date   • Diabetes mellitus due to underlying condition with diabetic neuropathic arthropathy (CMS/HCC)    • Diabetic neuropathy (CMS/HCC)    • Male erectile disorder    • Myopia    • Tobacco dependence syndrome    • Type 2 diabetes mellitus without complications (CMS/HCC)        No Known Allergies      Current Outpatient Medications:   •  amitriptyline (ELAVIL) 10 MG tablet, Take 10 mg by mouth Every Night., Disp: , Rfl:   •  aspirin 81 MG EC tablet, Take 81 mg by mouth Daily., Disp: , Rfl:   •  atorvastatin (LIPITOR) 40 MG tablet, Take 40 mg by mouth Daily., Disp: , Rfl:   •  cyclobenzaprine (FLEXERIL) 10 MG tablet, Take 1 tablet by mouth 3 (Three) Times a Day As Needed for muscle spasms., Disp: 90 tablet, Rfl: 3  •  gabapentin (NEURONTIN) 300 MG capsule, Take 1 capsule by mouth 3 (Three) Times a Day. 1 CAP IN THE MORNING AND 2 CAPS BED (Patient taking differently: Take 600 mg by mouth 3 (Three) Times a Day. 1 CAP IN THE MORNING AND 2 CAPS BED), Disp: 90 capsule, Rfl: 5  •  glipiZIDE (GLUCOTROL) 10 MG tablet, Take 10 mg by mouth Daily., Disp: , Rfl:   •  linagliptin (TRADJENTA) 5 MG tablet tablet, Take 1 tablet by mouth Daily., Disp: 30 tablet, Rfl: 5  •  lisinopril (PRINIVIL,ZESTRIL) 5 MG tablet, Take 5 mg by mouth Daily., Disp: , Rfl:   •  metFORMIN (GLUCOPHAGE) 1000 MG tablet, Take 1 tablet by mouth 2 (Two) Times a Day With Meals., Disp: 60 tablet, Rfl: 5  •  omega-3 acid ethyl esters (LOVAZA) 1 g capsule, Take 2 g by mouth 2 (Two) Times a Day., Disp: , Rfl:   •  Omega-3 Fatty  "Acids (OMEGA 3 PO), Take  by mouth., Disp: , Rfl:   •  omeprazole (priLOSEC) 20 MG capsule, Take 20 mg by mouth Daily., Disp: , Rfl:   •  pravastatin (PRAVACHOL) 20 MG tablet, Take 20 mg by mouth Daily., Disp: , Rfl:   •  traMADol (ULTRAM) 50 MG tablet, 1-2 tabs po q eight hour prn pain, Disp: 40 tablet, Rfl: 0  •  meloxicam (MOBIC) 15 MG tablet, Take 1 tablet by mouth Daily for 30 days., Disp: 30 tablet, Rfl: 0    History reviewed. No pertinent surgical history.        ROS: Review of systems has been updated as of today's date.  All other systems are negative except as noted previously.    PHYSICAL EXAMINATION:       Ht 185.4 cm (73\")   Wt 128 kg (281 lb 1.6 oz)   BMI 37.09 kg/m²     Physical Exam   Constitutional: He is oriented to person, place, and time. He appears well-developed.   HENT:   Head: Normocephalic and atraumatic.   Eyes: EOM are normal. Pupils are equal, round, and reactive to light.   Neck: Neck supple. No tracheal deviation present.   Pulmonary/Chest: Effort normal.   Musculoskeletal: He exhibits tenderness. He exhibits no edema or deformity.   Neurological: He is alert and oriented to person, place, and time.   Skin: Skin is warm and dry. No erythema.   Psychiatric: He has a normal mood and affect.       GAIT:     []  Normal  []  Antalgic    Assistive device: [x]  None  []  Walker     []  Crutches  []  Cane     []  Wheelchair  []  Stretcher    Ortho Exam  Tender about the shoulder anteriorly.  Not to tender over the AC joint.  Passive motion is intact.  Neurovascular intact.    Mri Shoulder Right Without Contrast    Result Date: 2/28/2019  Narrative: MRI right shoulder. HISTORY: Right shoulder pain. Prior exam: Right shoulder December 16, 2018. TECHNIQUE: Multiplanar multisequence noncontrast images right shoulder. There is acromioclavicular joint arthrosis. This causes only mild underlying patient. Normal supraspinatous and infraspinatus tendons. No full-thickness tears. No signal abnormality, " tendinosis. Normal intact biceps tendon. Normal glenoid anabella. Normal subscapularis. There is small healing impaction type fracture superior lateral aspect of the humeral head series 7 image five. There is some surrounding subtle bone edema probably resolving contusion. Series 7 image seven.     Impression: CONCLUSION: There is a very small area of cortical regularity probably subtle healing impaction type fracture superior lateral and posterior aspect humeral head with some very subtle surrounding bone edema, resolving contusion. Normal rotator cuff. No evidence of any full-thickness tear or tendinosis. Acromioclavicular joint arthrosis causing only minimal underlying impingement. MRI right shoulder is otherwise remarkable. Electronically signed by:  Donavan Christianson MD  2/28/2019 3:50 PM CST Workstation: [id number]      I reviewed the scan and agree with above      ASSESSMENT:    Diagnoses and all orders for this visit:    Injury of right rotator cuff, subsequent encounter  -     Ambulatory Referral to Physical Therapy Evaluate and treat; ROM, Strengthening    Acute pain of right shoulder  -     Ambulatory Referral to Physical Therapy Evaluate and treat; ROM, Strengthening    Chronic right shoulder pain  -     Ambulatory Referral to Physical Therapy Evaluate and treat; ROM, Strengthening    Shoulder impingement syndrome, right  -     Ambulatory Referral to Physical Therapy Evaluate and treat; ROM, Strengthening    Closed nondisplaced fracture of greater tuberosity of right humerus with routine healing, subsequent encounter  -     Ambulatory Referral to Physical Therapy Evaluate and treat; ROM, Strengthening          PLAN he has a greater tuberosity fracture.  I think this explains his immediate pain.  I think this will respond and improve spontaneously.  I will order physical therapy will start some gentle range of motion and strengthening.  I suspect will be probably at least 6-8 weeks before he is able to go back to  work full-time.  He can check to see if he has anything he can do just one-handed at this point.  I will see him back in a month x-ray on arrival.  The call sooner with any problems.    No Follow-up on file.      This document has been electronically signed by Jaden Aleman MD on March 4, 2019 10:08 AM

## 2019-03-05 ENCOUNTER — HOSPITAL ENCOUNTER (OUTPATIENT)
Dept: PHYSICAL THERAPY | Facility: HOSPITAL | Age: 49
Setting detail: THERAPIES SERIES
Discharge: HOME | End: 2019-03-05

## 2019-03-05 DIAGNOSIS — G89.29 CHRONIC RIGHT SHOULDER PAIN: ICD-10-CM

## 2019-03-05 DIAGNOSIS — M75.41 SHOULDER IMPINGEMENT SYNDROME, RIGHT: ICD-10-CM

## 2019-03-05 DIAGNOSIS — S42.254D CLOSED NONDISPLACED FRACTURE OF GREATER TUBEROSITY OF RIGHT HUMERUS WITH ROUTINE HEALING, SUBSEQUENT ENCOUNTER: ICD-10-CM

## 2019-03-05 DIAGNOSIS — M25.511 ACUTE PAIN OF RIGHT SHOULDER: ICD-10-CM

## 2019-03-05 DIAGNOSIS — S46.001D INJURY OF RIGHT ROTATOR CUFF, SUBSEQUENT ENCOUNTER: Primary | ICD-10-CM

## 2019-03-05 DIAGNOSIS — M25.511 CHRONIC RIGHT SHOULDER PAIN: ICD-10-CM

## 2019-03-05 PROCEDURE — 97162 PT EVAL MOD COMPLEX 30 MIN: CPT | Performed by: PHYSICAL THERAPIST

## 2019-03-06 NOTE — THERAPY EVALUATION
Outpatient Physical Therapy Ortho Initial Evaluation  [facility]     Patient Name: Jeremy Siu  : 1970  MRN: [id number]  Today's Date: 3/5/2019      Visit Date: 2019  Attendance:  (authorization required)  Subjective Improvement: n/a  Next MD Appt: 19  Recert Date: 3/26/19    Therapy Diagnosis: R shoulder humeral head impaction fracture/greater tuberosity fracture, DOI: 18         Past Medical History:   Diagnosis Date   • Diabetes mellitus due to underlying condition with diabetic neuropathic arthropathy (CMS/HCC)    • Diabetic neuropathy (CMS/HCC)    • Male erectile disorder    • Myopia    • Tobacco dependence syndrome    • Type 2 diabetes mellitus without complications (CMS/HCC)         History reviewed. No pertinent surgical history.      Current Outpatient Medications:   •  amitriptyline (ELAVIL) 10 MG tablet, Take 10 mg by mouth Every Night., Disp: , Rfl:   •  aspirin 81 MG EC tablet, Take 81 mg by mouth Daily., Disp: , Rfl:   •  atorvastatin (LIPITOR) 40 MG tablet, Take 40 mg by mouth Daily., Disp: , Rfl:   •  cyclobenzaprine (FLEXERIL) 10 MG tablet, Take 1 tablet by mouth 3 (Three) Times a Day As Needed for muscle spasms., Disp: 90 tablet, Rfl: 3  •  gabapentin (NEURONTIN) 300 MG capsule, Take 1 capsule by mouth 3 (Three) Times a Day. 1 CAP IN THE MORNING AND 2 CAPS BED (Patient taking differently: Take 600 mg by mouth 3 (Three) Times a Day. 1 CAP IN THE MORNING AND 2 CAPS BED), Disp: 90 capsule, Rfl: 5  •  glipiZIDE (GLUCOTROL) 10 MG tablet, Take 10 mg by mouth Daily., Disp: , Rfl:   •  linagliptin (TRADJENTA) 5 MG tablet tablet, Take 1 tablet by mouth Daily., Disp: 30 tablet, Rfl: 5  •  lisinopril (PRINIVIL,ZESTRIL) 5 MG tablet, Take 5 mg by mouth Daily., Disp: , Rfl:   •  meloxicam (MOBIC) 15 MG tablet, Take 1 tablet by mouth Daily for 30 days., Disp: 30 tablet, Rfl: 0  •  metFORMIN (GLUCOPHAGE) 1000 MG tablet, Take 1 tablet by mouth 2 (Two) Times a Day With  Meals., Disp: 60 tablet, Rfl: 5  •  omega-3 acid ethyl esters (LOVAZA) 1 g capsule, Take 2 g by mouth 2 (Two) Times a Day., Disp: , Rfl:   •  Omega-3 Fatty Acids (OMEGA 3 PO), Take  by mouth., Disp: , Rfl:   •  omeprazole (priLOSEC) 20 MG capsule, Take 20 mg by mouth Daily., Disp: , Rfl:   •  pravastatin (PRAVACHOL) 20 MG tablet, Take 20 mg by mouth Daily., Disp: , Rfl:   •  traMADol (ULTRAM) 50 MG tablet, 1-2 tabs po q eight hour prn pain, Disp: 40 tablet, Rfl: 0    No Known Allergies    Visit Dx:     ICD-10-CM ICD-9-CM   1. Injury of right rotator cuff, subsequent encounter S46.001D V58.89     959.2   2. Acute pain of right shoulder M25.511 719.41   3. Chronic right shoulder pain M25.511 719.41    G89.29 338.29   4. Shoulder impingement syndrome, right M75.41 726.2   5. Closed nondisplaced fracture of greater tuberosity of right humerus with routine healing, subsequent encounter S42.254D V54.11       Patient History     Row Name 03/05/19 1300          Chief Complaint  Difficulty with daily activities;Pain  -SS    Type of Pain  Shoulder pain right  -SS    Date Current Problem(s) Began  12/16/18  -SS    Brief Description of Current Complaint  The pt was out walking in the evening in [place], [place] when injury occurred. He states he didn't see a man hole that was open in the road. He states that he stepped in the hole and fell down waist high. He was able to catch himself w/ both arms w/ the R arm taking more force. He states that he went to the ER at that time. He was seen for an evaluation at Sports Medicine on 1/3/19. The evaluating P.T. was concerned regarding shoulder injury and did not initiate therapy and referred him back to Dr. Aleman. Since then, he had an MRI. The MRI shows subtle healing impaction type fracture superior lateral and posterior aspect humeral head with some very subtle surrounding bone edema, resolving contusion, a normal RTC, and AC joint arthrosis. Dr. Aleman characterizes greater  "tuberosity fracture in his most recent note. He is continuing to have R shoulder pain. He tries to elevate his shoulder every day. Pain with movement of the shoulder. The sling helps it feel better \"to a point\" but pain if it stays in position for a while.  male with children.   -SS    Patient/Caregiver Goals  -- \"I want to be able to play with my kids again.\"  -SS    Current Tobacco Use  smoker  -SS    Smoking Status  1 ppd  -SS    Patient's Rating of General Health  Fair  -SS    Hand Dominance  right-handed  -SS    Occupation/sports/leisure activities  Unemployed at present. Previously worked as  at apartment complex. Hobbies: play on phone, play with his kids and grandkids  -SS    What clinical tests have you had for this problem?  X-ray;MRI  -    Results of Clinical Tests  MRI: subtle healing impaction type fracture superior lateral and posterior aspect humeral head with some very subtle surrounding bone edema, resolving contusion, a normal RTC, and AC joint arthrosis.   -SS          Pain Location  Shoulder right  -SS    Pain at Present  5  -SS    Pain at Best  3 over past 30 days  -SS    Pain at Worst  8 over past 30 days  -SS    Pain Frequency  Constant/continuous  -SS    Pain Description  -- \"like somebody is taking a knife and slowly pushing it.\"  -SS    What Performance Factors Make the Current Problem(s) WORSE?  trying to move arm, prolonged time in one position, tends to roll over onto the right while sleeping  -SS    What Performance Factors Make the Current Problem(s) BETTER?  medication temporarily  -SS    Is your sleep disturbed?  Yes  -SS    Is medication used to assist with sleep?  No  -SS    Difficulties at work?  unable to work  -SS    Difficulties with ADL's?  decreased use R UE and pain  -SS    Difficulties with recreational activities?  unable to play with children and grandchildren  -SS          Any falls in the past year:  Yes  -SS    Number of falls reported in the " last 12 months  1  -    Factors that contributed to the fall:  -- open manhole  -SS    Does patient have a fear of falling  No  -SS          Primary Language  English  -SS          Are you being hurt, hit, or frightened by anyone at home or in your life?  No  -SS    Are you being neglected by a caregiver  No  -SS      User Key  (r) = Recorded By, (t) = Taken By, (c) = Cosigned By    Initials Name Provider Type     Don Castellano, SARABJIT DPT Physical Therapist          PT Ortho     Row Name 03/05/19 1400       Subjective Comments    Subjective Comments  See Therapy Patient History  -SS       Precautions and Contraindications    Precautions  impaction fracture superior lateral and posterior humeral head  -SS       Subjective Pain    Able to rate subjective pain?  yes  -SS    Pre-Treatment Pain Level  5  -SS    Post-Treatment Pain Level  6  -SS       Posture/Observations    Posture/Observations Comments  Patient presents this date wearing sling on R UE. Guarding R UE. Obvious pain.  -SS       Right Upper Ext    Rt Shoulder Abduction AROM  deferred  -SS    Rt Shoulder Abduction PROM  70  -SS    Rt Shoulder Flexion AROM  98; supine  -SS    Rt Shoulder Flexion PROM  107  -SS    Rt Shoulder External Rotation AROM  22; measured supine with shoulder abducted 45 degrees  -SS    Rt Shoulder External Rotation PROM  30; measured supine with shoulder abducted 45 degrees  -SS    Rt Shoulder Internal Rotation AROM  37; measured supine with shoulder abducted 45 degrees  -SS    Rt Shoulder Internal Rotation PROM  60; measured supine with shoulder abducted 45 degrees  -SS       MMT (Manual Muscle Testing)    General MMT Comments  MMT deferred  -SS      User Key  (r) = Recorded By, (t) = Taken By, (c) = Cosigned By    Initials Name Provider Type     Don Castellano PT DPT Physical Therapist                      Therapy Education  Education Details: supine active shoulder flexion, scapular retractions, seated active  shoulder ER/IR in neutral  Given: HEP  Program: New  How Provided: Verbal, Demonstration  Provided to: Patient  Level of Understanding: Verbalized, Demonstrated     PT OP Goals     Row Name 03/05/19 1300          STG Date to Achieve  03/26/19  -    STG 1  Note a >/= 25% subjective improvement.  -    STG 2  QuickDASH score to be </= 50.  -    STG 3  Passive R shoulder flexion to be >/= 140 degrees.  -    STG 4  Passive R shoulder abduction to be >/= 90 degrees.  -    STG 5  Supine R shoulder active flexion to be >/= 120 degrees.  -    Additional STG's  STG 6;STG 7;STG 8  -    STG 6  Active R shoulder ER in 45 degrees of abduction to be >/= 45 degrees.  -    STG 7  Active R shoulder IR in 45 degrees of abduction to be >/= 60 degrees.  -          PT Goal Re-Cert Due Date  03/26/19  -      User Key  (r) = Recorded By, (t) = Taken By, (c) = Cosigned By    Initials Name Provider Type     Don Castellano, PT DPT Physical Therapist          PT Assessment/Plan     Row Name 03/05/19 1300          Functional Limitations  Limitation in home management;Limitations in community activities;Limitations in functional capacity and performance;Performance in leisure activities;Performance in self-care ADL;Performance in work activities  -    Impairments  Pain;Range of motion;Muscle strength  -    Assessment Comments  Patient is 11 weeks s/p shoulder injury with fracture of humeral head/greater tuberosity noted on MRI. Painful motion.  -    Rehab Potential  Good barrier: healing fracture  -    Patient/caregiver participated in establishment of treatment plan and goals  Yes  -    Patient would benefit from skilled therapy intervention  Yes  -          PT Frequency  2x/week  -    Predicted Duration of Therapy Intervention (Therapy Eval)  6-8 weeks  -    PT Plan Comments  PROM, supine AROM, AAROM, scapular muscle strengthening, shoulder muscle strengthening (start isometrics next), IFC estim  with ice for pain  -SS      User Key  (r) = Recorded By, (t) = Taken By, (c) = Cosigned By    Initials Name Provider Type    SS Don Castellano, PT DPT Physical Therapist                              Outcome Measure Options: Quick DASH  Quick DASH  Open a tight or new jar.: Moderate Difficulty  Do heavy household chores (e.g., wash walls, wash floors): Moderate Difficulty  Carry a shopping bag or briefcase: Mild Difficulty  Wash your back: Unable  Use a knife to cut food: Moderate Difficulty  Recreational activities in which you take some force or impact through your arm, should or hand (e.g. golf, hammering, tennis, etc.): Unable  During the past week, to what extent has your arm, shoulder, or hand problem interfered with your normal social activities with family, friends, neighbors or groups?: Quite a bit  During the past week, were you limited in your work or other regular daily activities as a result of your arm, shoulder or hand problem?: Moderately Limited  Arm, Shoulder, or hand pain: Severe  Tingling (pins and needles) in your arm, shoulder, or hand: Moderate  During the past week, how much difficulty have you had sleeping because of the pain in your arm, shoulder or hand?: Severe Difficulty  Number of Questions Answered: 11  Quick DASH Score: 63.64         Time Calculation:         Start Time: 1350  Stop Time: 1422  Time Calculation (min): 32 min     Therapy Charges for Today     Code Description Service Date Service Provider Modifiers Qty    84510530228 HC PT EVAL MOD COMPLEXITY 2 3/5/2019 Don Castellano, PT DPT GP 1                   Don Castellano PT, DPT, CHT  3/5/2019

## 2019-03-11 ENCOUNTER — HOSPITAL ENCOUNTER (OUTPATIENT)
Dept: PHYSICAL THERAPY | Facility: HOSPITAL | Age: 49
Setting detail: THERAPIES SERIES
Discharge: HOME | End: 2019-03-11

## 2019-03-11 DIAGNOSIS — G89.29 CHRONIC RIGHT SHOULDER PAIN: ICD-10-CM

## 2019-03-11 DIAGNOSIS — M25.511 ACUTE PAIN OF RIGHT SHOULDER: ICD-10-CM

## 2019-03-11 DIAGNOSIS — M75.41 SHOULDER IMPINGEMENT SYNDROME, RIGHT: ICD-10-CM

## 2019-03-11 DIAGNOSIS — S46.001D INJURY OF RIGHT ROTATOR CUFF, SUBSEQUENT ENCOUNTER: Primary | ICD-10-CM

## 2019-03-11 DIAGNOSIS — S42.254D CLOSED NONDISPLACED FRACTURE OF GREATER TUBEROSITY OF RIGHT HUMERUS WITH ROUTINE HEALING, SUBSEQUENT ENCOUNTER: ICD-10-CM

## 2019-03-11 DIAGNOSIS — M25.511 CHRONIC RIGHT SHOULDER PAIN: ICD-10-CM

## 2019-03-11 PROCEDURE — G0283 ELEC STIM OTHER THAN WOUND: HCPCS

## 2019-03-11 PROCEDURE — 97110 THERAPEUTIC EXERCISES: CPT

## 2019-03-11 NOTE — THERAPY TREATMENT NOTE
Outpatient Physical Therapy Ortho Treatment Note  [facility]     Patient Name: Jeremy Siu  : 1970  MRN: [id number]  Today's Date: 3/11/2019      Visit Date: 2019     Subjective Improvement 0  Visits 2/2  Visits approved 8 from 3- to 2019  RTMD 2019  Recert Date 3-    Right shoulder humeral head impaction fracture greater tuberosity fracture DOI on 2019    Visit Dx:    ICD-10-CM ICD-9-CM   1. Injury of right rotator cuff, subsequent encounter S46.001D V58.89     959.2   2. Acute pain of right shoulder M25.511 719.41   3. Chronic right shoulder pain M25.511 719.41    G89.29 338.29   4. Shoulder impingement syndrome, right M75.41 726.2   5. Closed nondisplaced fracture of greater tuberosity of right humerus with routine healing, subsequent encounter S42.254D V54.11       Patient Active Problem List   Diagnosis   • Acromioclavicular sprain   • Cigarette smoker   • Type 2 diabetes mellitus without complication, without long-term current use of insulin (CMS/HCC)   • Rotator cuff injury   • Chronic right shoulder pain   • Acute pain of right shoulder   • Nondisplaced fracture of greater tuberosity of right humerus with routine healing   • Shoulder impingement syndrome, right        Past Medical History:   Diagnosis Date   • Diabetes mellitus due to underlying condition with diabetic neuropathic arthropathy (CMS/HCC)    • Diabetic neuropathy (CMS/HCC)    • Male erectile disorder    • Myopia    • Tobacco dependence syndrome    • Type 2 diabetes mellitus without complications (CMS/HCC)         No past surgical history on file.    PT Ortho     Row Name 19 0900       Subjective Comments    Subjective Comments  States that he has been wearing the sling some.  U  -CP       Precautions and Contraindications    Precautions  impaction fracture superior lateral and posterior humeral head  -CP       Posture/Observations    Posture/Observations Comments  wearing sling   "-CP      User Key  (r) = Recorded By, (t) = Taken By, (c) = Cosigned By    Initials Name Provider Type    Trudy Tijerina, MARIKA Physical Therapy Assistant                      PT Assessment/Plan     Row Name 03/11/19 0916          PT Assessment    Assessment Comments  Patient very guarded with PROM  -CP        PT Plan    PT Frequency  2x/week  -CP     Predicted Duration of Therapy Intervention (Therapy Eval)  6-8 weeks  -CP     PT Plan Comments  Cont with POC.  AAROM supine with cane  -CP       User Key  (r) = Recorded By, (t) = Taken By, (c) = Cosigned By    Initials Name Provider Type    Trudy Tijerina PTA Physical Therapy Assistant          Modalities     Row Name 03/11/19 0900             Ice    Ice Applied  Yes  -CP      Location  Right Shoulder  -CP      Rx Minutes  -- 20 minutes with IFC  -CP      Ice S/P Rx  Yes  -CP         ELECTRICAL STIMULATION    Attended/Unattended  Unattended  -CP      Stimulation Type  IFC  -CP      Location/Electrode Placement/Other  right shoulder  -CP       PT E-Stim Unattended (Manual) Minutes  20  -CP        User Key  (r) = Recorded By, (t) = Taken By, (c) = Cosigned By    Initials Name Provider Type    Trudy Tijerina PTA Physical Therapy Assistant          Exercises     Row Name 03/11/19 0900             Subjective Comments    Subjective Comments  States that he has been wearing the sling some.  U  -CP         Subjective Pain    Able to rate subjective pain?  yes  -CP      Pre-Treatment Pain Level  6  -CP      Post-Treatment Pain Level  4  -CP         Exercise 1    Exercise Name 1  PROM right shoulder  -CP      Time 1  15  -CP         Exercise 2    Exercise Name 2  supine AAROM hands clasp and chest press with fl  -CP      Sets 2  2  -CP      Reps 2  10  -CP         Exercise 3    Exercise Name 3  Isometric fl, AB, ext, IR, ER  -CP      Reps 3  10  -CP      Time 3  5\" hold  -CP         Exercise 4    Exercise Name 4  seated scap squeezes  -CP      Sets 4  2  " -CP      Reps 4  10  -CP         Exercise 5    Exercise Name 5  review HEP  -CP        User Key  (r) = Recorded By, (t) = Taken By, (c) = Cosigned By    Initials Name Provider Type    Trudy Tijerina, MARIKA Physical Therapy Assistant                         PT OP Goals     Row Name 03/11/19 0900          PT Short Term Goals    STG Date to Achieve  03/26/19  -CP     STG 1  Note a >/= 25% subjective improvement.  -CP     STG 1 Progress  Not Met  -CP     STG 2  QuickDASH score to be </= 50.  -CP     STG 2 Progress  Not Met  -CP     STG 3  Passive R shoulder flexion to be >/= 140 degrees.  -CP     STG 3 Progress  Progressing  -CP     STG 4  Passive R shoulder abduction to be >/= 90 degrees.  -CP     STG 4 Progress  Progressing  -CP     STG 5  Supine R shoulder active flexion to be >/= 120 degrees.  -CP     STG 5 Progress  Progressing  -CP     STG 6  Active R shoulder ER in 45 degrees of abduction to be >/= 45 degrees.  -CP     STG 7  Active R shoulder IR in 45 degrees of abduction to be >/= 60 degrees.  -CP        Time Calculation    PT Goal Re-Cert Due Date  03/26/19  -CP       User Key  (r) = Recorded By, (t) = Taken By, (c) = Cosigned By    Initials Name Provider Type    Trudy Tijerina PTA Physical Therapy Assistant          Therapy Education  Education Details: seated scap squeezes, supine AAROM fl with hands clasp with chest press  Given: HEP  Program: New  How Provided: Verbal, Demonstration  Provided to: Patient  Level of Understanding: Teach back education performed, Verbalized, Demonstrated              Time Calculation:   Start Time: 0845  Stop Time: 0948  Time Calculation (min): 63 min  Total Timed Code Minutes- PT: 38 minute(s)  Therapy Suggested Charges     Code   Minutes Charges    91718 (CPT®) Hc Pt Neuromusc Re Education Ea 15 Min      12971 (CPT®) Hc Pt Ther Proc Ea 15 Min      10220 (CPT®) Hc Gait Training Ea 15 Min      61612 (CPT®) Hc Pt Therapeutic Act Ea 15 Min      03511 (CPT®) Hc Pt  Manual Therapy Ea 15 Min      78684 (CPT®) Hc Pt Ther Massage- Per 15 Min      05980 (CPT®) Hc Pt Iontophoresis Ea 15 Min      80429 (CPT®) Hc Pt Elec Stim Ea-Per 15 Min      63021 (CPT®) Hc Pt Ultrasound Ea 15 Min      61595 (CPT®) Hc Pt Self Care/Mgmt/Train Ea 15 Min      81186 (CPT®) Hc Pt Prosthetic (S) Train Initial Encounter, Each 15 Min      34611 (CPT®) Hc Orthotic(S) Mgmt/Train Initial Encounter, Each 15min      26422 (CPT®) Hc Pt Aquatic Therapy Ea 15 Min      20796 (CPT®) Hc Pt Orthotic(S)/Prosthetic(S) Encounter, Each 15 Min       (CPT®) Hc Pt Electrical Stim Unattended 20 1    Total  20 1        Therapy Charges for Today     Code Description Service Date Service Provider Modifiers Qty    09249621211 HC PT THER PROC EA 15 MIN 3/11/2019 Trudy Thornton, PTA GP 3    39287474002 HC PT ELECTRICAL STIM UNATTENDED 3/11/2019 Trudy Thornton, PTA  1                    Trudy Thornton, PTA  3/11/2019

## 2019-03-13 ENCOUNTER — HOSPITAL ENCOUNTER (OUTPATIENT)
Dept: PHYSICAL THERAPY | Facility: HOSPITAL | Age: 49
Setting detail: THERAPIES SERIES
Discharge: HOME | End: 2019-03-13

## 2019-03-13 DIAGNOSIS — S42.254D CLOSED NONDISPLACED FRACTURE OF GREATER TUBEROSITY OF RIGHT HUMERUS WITH ROUTINE HEALING, SUBSEQUENT ENCOUNTER: ICD-10-CM

## 2019-03-13 DIAGNOSIS — M75.41 SHOULDER IMPINGEMENT SYNDROME, RIGHT: ICD-10-CM

## 2019-03-13 DIAGNOSIS — M25.511 ACUTE PAIN OF RIGHT SHOULDER: ICD-10-CM

## 2019-03-13 DIAGNOSIS — G89.29 CHRONIC RIGHT SHOULDER PAIN: ICD-10-CM

## 2019-03-13 DIAGNOSIS — S46.001D INJURY OF RIGHT ROTATOR CUFF, SUBSEQUENT ENCOUNTER: Primary | ICD-10-CM

## 2019-03-13 DIAGNOSIS — M25.511 CHRONIC RIGHT SHOULDER PAIN: ICD-10-CM

## 2019-03-13 PROCEDURE — 97110 THERAPEUTIC EXERCISES: CPT

## 2019-03-13 PROCEDURE — G0283 ELEC STIM OTHER THAN WOUND: HCPCS

## 2019-03-13 NOTE — THERAPY TREATMENT NOTE
Outpatient Physical Therapy Ortho Treatment Note  [facility]     Patient Name: Jeremy Siu  : 1970  MRN: [id number]  Today's Date: 3/13/2019      Visit Date: 2019     Subjective Improvement 0  Visits 3/3  Visits approved 8 from 3- to 2019  RTMD 2019  Recert Date 3-    Right Shoulder humeral head impaction fracture greater tuberosity fracture DOI 2018    Visit Dx:    ICD-10-CM ICD-9-CM   1. Injury of right rotator cuff, subsequent encounter S46.001D V58.89     959.2   2. Acute pain of right shoulder M25.511 719.41   3. Chronic right shoulder pain M25.511 719.41    G89.29 338.29   4. Shoulder impingement syndrome, right M75.41 726.2   5. Closed nondisplaced fracture of greater tuberosity of right humerus with routine healing, subsequent encounter S42.254D V54.11       Patient Active Problem List   Diagnosis   • Acromioclavicular sprain   • Cigarette smoker   • Type 2 diabetes mellitus without complication, without long-term current use of insulin (CMS/HCC)   • Rotator cuff injury   • Chronic right shoulder pain   • Acute pain of right shoulder   • Nondisplaced fracture of greater tuberosity of right humerus with routine healing   • Shoulder impingement syndrome, right        Past Medical History:   Diagnosis Date   • Diabetes mellitus due to underlying condition with diabetic neuropathic arthropathy (CMS/HCC)    • Diabetic neuropathy (CMS/HCC)    • Male erectile disorder    • Myopia    • Tobacco dependence syndrome    • Type 2 diabetes mellitus without complications (CMS/HCC)         No past surgical history on file.    PT Ortho     Row Name 19 0800       Subjective Comments    Subjective Comments  Reports that today is not a good day.  Has increase pain  -CP       Precautions and Contraindications    Precautions  impaction fracture superior lateral and posterior humeral head  -CP       Subjective Pain    Able to rate subjective pain?  yes  -CP     Pre-Treatment Pain Level  8  -CP       Posture/Observations    Posture/Observations Comments  wearing sling  -CP       Right Upper Ext    Rt Shoulder Flexion AROM  AAROM [id number]  -CP    Row Name 03/11/19 0900       Subjective Comments    Subjective Comments  States that he has been wearing the sling some.  U  -CP       Precautions and Contraindications    Precautions  impaction fracture superior lateral and posterior humeral head  -CP       Posture/Observations    Posture/Observations Comments  wearing sling  -CP      User Key  (r) = Recorded By, (t) = Taken By, (c) = Cosigned By    Initials Name Provider Type    Trudy Tijerina, MARIKA Physical Therapy Assistant                      PT Assessment/Plan     Row Name 03/13/19 0950          PT Assessment    Assessment Comments  Improved tolerance to PROM.  Patient was less guarded with PROM and AAROM  -CP        PT Plan    PT Frequency  2x/week  -CP     Predicted Duration of Therapy Intervention (Therapy Eval)  6-8 weeks  -CP     PT Plan Comments  Cont with POC.  Scap squeezes.  supine AAROM with cane ER  -CP       User Key  (r) = Recorded By, (t) = Taken By, (c) = Cosigned By    Initials Name Provider Type    Trudy Tijerina, MARIKA Physical Therapy Assistant          Modalities     Row Name 03/13/19 0800             Moist Heat    MH Applied  Yes  -CP      Location  right shoulder  -CP      Rx Minutes  10 mins  -CP      MH Prior to Rx  Yes  -CP         Ice    Ice Applied  Yes  -CP      Location  right shoulder  -CP      Rx Minutes  -- 20 minutes with IFC  -CP      Ice S/P Rx  Yes  -CP         ELECTRICAL STIMULATION    Attended/Unattended  Unattended  -CP      Stimulation Type  IFC  -CP      Location/Electrode Placement/Other  right shoulder  -CP       PT E-Stim Unattended (Manual) Minutes  20  -CP        User Key  (r) = Recorded By, (t) = Taken By, (c) = Cosigned By    Initials Name Provider Type    Trudy Tijerina PTA Physical Therapy Assistant           Exercises     Row Name 03/13/19 0800             Subjective Comments    Subjective Comments  Reports that today is not a good day.  Has increase pain  -CP         Subjective Pain    Able to rate subjective pain?  yes  -CP      Pre-Treatment Pain Level  8  -CP      Post-Treatment Pain Level  4  -CP         Exercise 1    Exercise Name 1  MHP to right shoulder  -CP      Time 1  10  -CP         Exercise 2    Exercise Name 2  PROM right shoulder  -CP         Exercise 3    Exercise Name 3  supine AAROM shoulder fl with cane  -CP      Sets 3  2  -CP      Reps 3  10  -CP         Exercise 4    Exercise Name 4  supine Horz AB/AD with cane  -CP      Sets 4  2  -CP      Reps 4  10  -CP        User Key  (r) = Recorded By, (t) = Taken By, (c) = Cosigned By    Initials Name Provider Type    Trudy Tijerina, MARIKA Physical Therapy Assistant                         PT OP Goals     Row Name 03/13/19 0900          PT Short Term Goals    STG Date to Achieve  03/26/19  -CP     STG 1  Note a >/= 25% subjective improvement.  -CP     STG 1 Progress  Not Met  -CP     STG 2  QuickDASH score to be </= 50.  -CP     STG 2 Progress  Not Met  -CP     STG 3  Passive R shoulder flexion to be >/= 140 degrees.  -CP     STG 3 Progress  Progressing  -CP     STG 4  Passive R shoulder abduction to be >/= 90 degrees.  -CP     STG 4 Progress  Progressing  -CP     STG 5  Supine R shoulder active flexion to be >/= 120 degrees.  -CP     STG 5 Progress  Progressing  -CP     STG 6  Active R shoulder ER in 45 degrees of abduction to be >/= 45 degrees.  -CP     STG 7  Active R shoulder IR in 45 degrees of abduction to be >/= 60 degrees.  -CP        Time Calculation    PT Goal Re-Cert Due Date  03/26/19  -CP       User Key  (r) = Recorded By, (t) = Taken By, (c) = Cosigned By    Initials Name Provider Type    Trudy Tijerina, MARIKA Physical Therapy Assistant                         Time Calculation:   Start Time: 0845  Stop Time: 0950  Time Calculation  (min): 65 min  Total Timed Code Minutes- PT: 32 minute(s)  Therapy Suggested Charges     Code   Minutes Charges    51519 (CPT®) Hc Pt Neuromusc Re Education Ea 15 Min      22317 (CPT®) Hc Pt Ther Proc Ea 15 Min      71103 (CPT®) Hc Gait Training Ea 15 Min      77393 (CPT®) Hc Pt Therapeutic Act Ea 15 Min      17631 (CPT®) Hc Pt Manual Therapy Ea 15 Min      49958 (CPT®) Hc Pt Ther Massage- Per 15 Min      89298 (CPT®) Hc Pt Iontophoresis Ea 15 Min      20575 (CPT®) Hc Pt Elec Stim Ea-Per 15 Min      65965 (CPT®) Hc Pt Ultrasound Ea 15 Min      62181 (CPT®) Hc Pt Self Care/Mgmt/Train Ea 15 Min      37802 (CPT®) Hc Pt Prosthetic (S) Train Initial Encounter, Each 15 Min      75682 (CPT®) Hc Orthotic(S) Mgmt/Train Initial Encounter, Each 15min      71201 (CPT®) Hc Pt Aquatic Therapy Ea 15 Min      80914 (CPT®) Hc Pt Orthotic(S)/Prosthetic(S) Encounter, Each 15 Min       (CPT®) Hc Pt Electrical Stim Unattended 20 1    Total  20 1        Therapy Charges for Today     Code Description Service Date Service Provider Modifiers Qty    96741627372 HC PT THER SUPP EA 15 MIN 3/13/2019 Trudy Thornton, PTA GP 1    67608739681 HC PT THER PROC EA 15 MIN 3/13/2019 Trudy Thornton, PTA GP 2    19312961412 HC PT ELECTRICAL STIM UNATTENDED 3/13/2019 Trudy Thornton, PTA  1                    Trudy Thornton, PTA  3/13/2019

## 2019-03-18 ENCOUNTER — HOSPITAL ENCOUNTER (OUTPATIENT)
Dept: PHYSICAL THERAPY | Facility: HOSPITAL | Age: 49
Setting detail: THERAPIES SERIES
Discharge: HOME | End: 2019-03-18

## 2019-03-18 DIAGNOSIS — G89.29 CHRONIC RIGHT SHOULDER PAIN: ICD-10-CM

## 2019-03-18 DIAGNOSIS — M25.511 CHRONIC RIGHT SHOULDER PAIN: ICD-10-CM

## 2019-03-18 DIAGNOSIS — M25.511 ACUTE PAIN OF RIGHT SHOULDER: ICD-10-CM

## 2019-03-18 DIAGNOSIS — S42.254D CLOSED NONDISPLACED FRACTURE OF GREATER TUBEROSITY OF RIGHT HUMERUS WITH ROUTINE HEALING, SUBSEQUENT ENCOUNTER: ICD-10-CM

## 2019-03-18 DIAGNOSIS — M75.41 SHOULDER IMPINGEMENT SYNDROME, RIGHT: ICD-10-CM

## 2019-03-18 DIAGNOSIS — S46.001D INJURY OF RIGHT ROTATOR CUFF, SUBSEQUENT ENCOUNTER: Primary | ICD-10-CM

## 2019-03-18 PROCEDURE — G0283 ELEC STIM OTHER THAN WOUND: HCPCS

## 2019-03-18 PROCEDURE — 97110 THERAPEUTIC EXERCISES: CPT

## 2019-03-18 NOTE — THERAPY TREATMENT NOTE
Outpatient Physical Therapy Ortho Treatment Note  [facility]     Patient Name: Jeremy Siu  : 1970  MRN: [id number]  Today's Date: 3/18/2019      Visit Date: 2019     Subjective Improvement 75%  Visits 4/4  Visits approved 8 from 3- to 2019  RTMD 2019  Recert Date 3-    Right shoulder humeral head impaction fracture greater tuberosity on 2018    Visit Dx:    ICD-10-CM ICD-9-CM   1. Injury of right rotator cuff, subsequent encounter S46.001D V58.89     959.2   2. Acute pain of right shoulder M25.511 719.41   3. Chronic right shoulder pain M25.511 719.41    G89.29 338.29   4. Shoulder impingement syndrome, right M75.41 726.2   5. Closed nondisplaced fracture of greater tuberosity of right humerus with routine healing, subsequent encounter S42.254D V54.11       Patient Active Problem List   Diagnosis   • Acromioclavicular sprain   • Cigarette smoker   • Type 2 diabetes mellitus without complication, without long-term current use of insulin (CMS/HCC)   • Rotator cuff injury   • Chronic right shoulder pain   • Acute pain of right shoulder   • Nondisplaced fracture of greater tuberosity of right humerus with routine healing   • Shoulder impingement syndrome, right        Past Medical History:   Diagnosis Date   • Diabetes mellitus due to underlying condition with diabetic neuropathic arthropathy (CMS/HCC)    • Diabetic neuropathy (CMS/HCC)    • Male erectile disorder    • Myopia    • Tobacco dependence syndrome    • Type 2 diabetes mellitus without complications (CMS/HCC)         No past surgical history on file.                    PT Assessment/Plan     Row Name 19 1137          PT Assessment    Assessment Comments  Improved tolerance with PROM.  Patient had 75% subjective improvement.  -CP        PT Plan    PT Frequency  2x/week  -CP     Predicted Duration of Therapy Intervention (Therapy Eval)  6-8 weeks  -CP     PT Plan Comments  Cont with POC.  scap  "strengthening next  take measurements next visit  -CP       User Key  (r) = Recorded By, (t) = Taken By, (c) = Cosigned By    Initials Name Provider Type    CP Trudy Thornton, MARIKA Physical Therapy Assistant          Modalities     Row Name 03/18/19 0800             Ice    Ice Applied  Yes  -CP      Location  right shoulder  -CP      Rx Minutes  -- 20 minutes with iFC  -CP      Ice S/P Rx  Yes  -CP         ELECTRICAL STIMULATION    Attended/Unattended  Unattended  -CP      Stimulation Type  IFC  -CP      Location/Electrode Placement/Other  right shoulder  -CP       PT E-Stim Unattended (Manual) Minutes  20  -CP        User Key  (r) = Recorded By, (t) = Taken By, (c) = Cosigned By    Initials Name Provider Type    Trudy Tijerina, MARIKA Physical Therapy Assistant          Exercises     Row Name 03/18/19 0800             Subjective Comments    Subjective Comments  Patient cont to report right shoulder pain however not as bad as it was last week.  -CP         Subjective Pain    Able to rate subjective pain?  yes  -CP      Pre-Treatment Pain Level  5  -CP      Post-Treatment Pain Level  3  -CP         Exercise 1    Exercise Name 1  seated scap squeezes  -CP      Sets 1  2  -CP      Reps 1  10  -CP         Exercise 2    Exercise Name 2  seted no money  -CP      Sets 2  2  -CP      Reps 2  10  -CP         Exercise 3    Exercise Name 3  supine AAROM shoulder fl  -CP      Sets 3  2  -CP      Reps 3  10  -CP         Exercise 4    Exercise Name 4  supine Horz AB/AD with cane  -CP      Sets 4  2  -CP      Reps 4  10  -CP         Exercise 5    Exercise Name 5  PROM  -CP      Time 5  15  -CP         Exercise 6    Exercise Name 6  Right Shoulder isometrics  -CP      Sets 6  1  -CP      Reps 6  10  -CP      Time 6  5\"  -CP      Additional Comments  5 way  -CP        User Key  (r) = Recorded By, (t) = Taken By, (c) = Cosigned By    Initials Name Provider Type    Trudy Tijerina, MARIKA Physical Therapy Assistant    "                      PT OP Goals     Row Name 03/18/19 1100          PT Short Term Goals    STG Date to Achieve  03/26/19  -CP     STG 1  Note a >/= 25% subjective improvement.  -CP     STG 1 Progress  Not Met  -CP     STG 2  QuickDASH score to be </= 50.  -CP     STG 2 Progress  Not Met  -CP     STG 3  Passive R shoulder flexion to be >/= 140 degrees.  -CP     STG 3 Progress  Progressing  -CP     STG 4  Passive R shoulder abduction to be >/= 90 degrees.  -CP     STG 4 Progress  Progressing  -CP     STG 5  Supine R shoulder active flexion to be >/= 120 degrees.  -CP     STG 5 Progress  Progressing  -CP     STG 6  Active R shoulder ER in 45 degrees of abduction to be >/= 45 degrees.  -CP     STG 7  Active R shoulder IR in 45 degrees of abduction to be >/= 60 degrees.  -CP        Time Calculation    PT Goal Re-Cert Due Date  03/26/19  -CP       User Key  (r) = Recorded By, (t) = Taken By, (c) = Cosigned By    Initials Name Provider Type    Trudy Tijerina, PTA Physical Therapy Assistant          Therapy Education  Given: HEP  Program: Reinforced  How Provided: Verbal, Demonstration  Provided to: Patient  Level of Understanding: Verbalized, Demonstrated              Time Calculation:   Start Time: 0845  Stop Time: 0945  Time Calculation (min): 60 min  Total Timed Code Minutes- PT: 30 minute(s)  Therapy Suggested Charges     Code   Minutes Charges    55922 (CPT®) Hc Pt Neuromusc Re Education Ea 15 Min      78955 (CPT®) Hc Pt Ther Proc Ea 15 Min      72816 (CPT®) Hc Gait Training Ea 15 Min      40872 (CPT®) Hc Pt Therapeutic Act Ea 15 Min      85916 (CPT®) Hc Pt Manual Therapy Ea 15 Min      87195 (CPT®) Hc Pt Ther Massage- Per 15 Min      59932 (CPT®) Hc Pt Iontophoresis Ea 15 Min      48755 (CPT®) Hc Pt Elec Stim Ea-Per 15 Min      07876 (CPT®) Hc Pt Ultrasound Ea 15 Min      72162 (CPT®) Hc Pt Self Care/Mgmt/Train Ea 15 Min      35980 (CPT®) Hc Pt Prosthetic (S) Train Initial Encounter, Each 15 Min      85602  (CPT®) Hc Orthotic(S) Mgmt/Train Initial Encounter, Each 15min      89195 (CPT®) Hc Pt Aquatic Therapy Ea 15 Min      39570 (CPT®) Hc Pt Orthotic(S)/Prosthetic(S) Encounter, Each 15 Min       (CPT®) Hc Pt Electrical Stim Unattended 20 1    Total  20 1        Therapy Charges for Today     Code Description Service Date Service Provider Modifiers Qty    63011932093 HC PT THER PROC EA 15 MIN 3/18/2019 Trudy Thornton, PTA GP 2    76867592853 HC PT ELECTRICAL STIM UNATTENDED 3/18/2019 Trudy Thornton, PTA  1                    Trudy Thornton, PTA  3/18/2019

## 2019-03-20 ENCOUNTER — HOSPITAL ENCOUNTER (OUTPATIENT)
Dept: PHYSICAL THERAPY | Facility: HOSPITAL | Age: 49
Setting detail: THERAPIES SERIES
Discharge: HOME | End: 2019-03-20

## 2019-03-20 DIAGNOSIS — S42.254D CLOSED NONDISPLACED FRACTURE OF GREATER TUBEROSITY OF RIGHT HUMERUS WITH ROUTINE HEALING, SUBSEQUENT ENCOUNTER: ICD-10-CM

## 2019-03-20 DIAGNOSIS — M25.511 CHRONIC RIGHT SHOULDER PAIN: ICD-10-CM

## 2019-03-20 DIAGNOSIS — G89.29 CHRONIC RIGHT SHOULDER PAIN: ICD-10-CM

## 2019-03-20 DIAGNOSIS — M75.41 SHOULDER IMPINGEMENT SYNDROME, RIGHT: ICD-10-CM

## 2019-03-20 DIAGNOSIS — M25.511 ACUTE PAIN OF RIGHT SHOULDER: ICD-10-CM

## 2019-03-20 DIAGNOSIS — S46.001D INJURY OF RIGHT ROTATOR CUFF, SUBSEQUENT ENCOUNTER: Primary | ICD-10-CM

## 2019-03-20 PROCEDURE — G0283 ELEC STIM OTHER THAN WOUND: HCPCS

## 2019-03-20 PROCEDURE — 97110 THERAPEUTIC EXERCISES: CPT

## 2019-03-20 NOTE — THERAPY TREATMENT NOTE
Outpatient Physical Therapy Ortho Treatment Note  [facility]     Patient Name: Jeremy Siu  : 1970  MRN: [id number]  Today's Date: 3/20/2019      Visit Date: 2019     Subjective Improvement 75%  Visits 5/5  Visits approved 8 from 3- to 2019  RTMD 2019  Recert Date 3-    Right Shoulder Humeral Head Impaction Fracture Greater Tuberosity on 2019    Visit Dx:    ICD-10-CM ICD-9-CM   1. Injury of right rotator cuff, subsequent encounter S46.001D V58.89     959.2   2. Acute pain of right shoulder M25.511 719.41   3. Chronic right shoulder pain M25.511 719.41    G89.29 338.29   4. Shoulder impingement syndrome, right M75.41 726.2   5. Closed nondisplaced fracture of greater tuberosity of right humerus with routine healing, subsequent encounter S42.254D V54.11       Patient Active Problem List   Diagnosis   • Acromioclavicular sprain   • Cigarette smoker   • Type 2 diabetes mellitus without complication, without long-term current use of insulin (CMS/HCC)   • Rotator cuff injury   • Chronic right shoulder pain   • Acute pain of right shoulder   • Nondisplaced fracture of greater tuberosity of right humerus with routine healing   • Shoulder impingement syndrome, right        Past Medical History:   Diagnosis Date   • Diabetes mellitus due to underlying condition with diabetic neuropathic arthropathy (CMS/HCC)    • Diabetic neuropathy (CMS/HCC)    • Male erectile disorder    • Myopia    • Tobacco dependence syndrome    • Type 2 diabetes mellitus without complications (CMS/HCC)         No past surgical history on file.    PT Ortho     Row Name 19 0700       Subjective Pain    Post-Treatment Pain Level  4  -CP       Posture/Observations    Posture/Observations Comments  wearing AB sling  -CP       Right Upper Ext    Rt Shoulder Abduction AROM  supine scap 115  -CP    Rt Shoulder Flexion AROM  supine 123  -CP    Rt Shoulder External Rotation AROM  supine at 45 of  AB 37  -CP      User Key  (r) = Recorded By, (t) = Taken By, (c) = Cosigned By    Initials Name Provider Type    Trudy Tijerina, MARIKA Physical Therapy Assistant                      PT Assessment/Plan     Row Name 03/20/19 1021          PT Assessment    Assessment Comments  Patient guarded with PROM does better and more relaxed with AAROM/AROM  -CP        PT Plan    PT Frequency  2x/week  -CP     Predicted Duration of Therapy Intervention (Therapy Eval)  6-8 weeks  -CP     PT Plan Comments  Cont wit POC.  Attempt PROM   -CP       User Key  (r) = Recorded By, (t) = Taken By, (c) = Cosigned By    Initials Name Provider Type    CP Trudy Thornton, MARIKA Physical Therapy Assistant          Modalities     Row Name 03/20/19 0700             Subjective Comments    Subjective Comments  Patient reports increase pain today.  States that he slept on his right shoulder yesterday  -CP         Subjective Pain    Able to rate subjective pain?  yes  -CP      Pre-Treatment Pain Level  6  -CP         Moist Heat    MH Applied  Yes  -CP      Location  right shoulder  -CP      Rx Minutes  10 mins  -CP      MH Prior to Rx  Yes  -CP         Ice    Ice Applied  Yes  -CP      Location  right shoulde  -CP      Rx Minutes  -- 20 minutes with IFC  -CP      Ice S/P Rx  Yes  -CP         ELECTRICAL STIMULATION    Attended/Unattended  Unattended  -CP      Stimulation Type  IFC  -CP      Location/Electrode Placement/Other  right shoulder  -CP       PT E-Stim Unattended (Manual) Minutes  20  -CP        User Key  (r) = Recorded By, (t) = Taken By, (c) = Cosigned By    Initials Name Provider Type    Trudy Tijerina PTA Physical Therapy Assistant        Exercises     Row Name 03/20/19 0700             Subjective Comments    Subjective Comments  Patient reports increase pain today.  States that he slept on his right shoulder yesterday  -CP         Subjective Pain    Able to rate subjective pain?  yes  -CP      Pre-Treatment Pain Level  6   -CP      Post-Treatment Pain Level  4  -CP         Exercise 1    Exercise Name 1  Supine AAROM fl with cane  -CP      Reps 1  20  -CP         Exercise 2    Exercise Name 2  AROM supine fl  -CP      Reps 2  20  -CP         Exercise 3    Exercise Name 3  AROM scap supine  -CP      Reps 3  20  -CP         Exercise 4    Exercise Name 4  AAROM ER with cane at 45 AB  -CP      Reps 4  20  -CP         Exercise 5    Exercise Name 5  scap rows with Tband  -CP      Sets 5  2  -CP      Reps 5  10  -CP      Time 5  yellow  -CP         Exercise 6    Exercise Name 6  review HEP  -CP        User Key  (r) = Recorded By, (t) = Taken By, (c) = Cosigned By    Initials Name Provider Type    CP Trudy Thornton, PTA Physical Therapy Assistant                       PT OP Goals     Row Name 03/20/19 1000          PT Short Term Goals    STG Date to Achieve  03/26/19  -CP     STG 1  Note a >/= 25% subjective improvement.  -CP     STG 1 Progress  Not Met  -CP     STG 2  QuickDASH score to be </= 50.  -CP     STG 2 Progress  Not Met  -CP     STG 3  Passive R shoulder flexion to be >/= 140 degrees.  -CP     STG 3 Progress  Progressing  -CP     STG 4  Passive R shoulder abduction to be >/= 90 degrees.  -CP     STG 4 Progress  Progressing  -CP     STG 5  Supine R shoulder active flexion to be >/= 120 degrees.  -CP     STG 5 Progress  Progressing  -CP     STG 6  Active R shoulder ER in 45 degrees of abduction to be >/= 45 degrees.  -CP     STG 7  Active R shoulder IR in 45 degrees of abduction to be >/= 60 degrees.  -CP        Time Calculation    PT Goal Re-Cert Due Date  03/26/19  -CP       User Key  (r) = Recorded By, (t) = Taken By, (c) = Cosigned By    Initials Name Provider Type    CP Trudy Thornton, PTA Physical Therapy Assistant          Therapy Education  Education Details: scap row with tband  Given: HEP  Program: New  How Provided: Verbal, Demonstration  Provided to: Patient  Level of Understanding: Teach back education performed,  Verbalized, Demonstrated              Time Calculation:   Start Time: 0845  Stop Time: 0950  Time Calculation (min): 65 min  Total Timed Code Minutes- PT: 28 minute(s)  Therapy Charges for Today     Code Description Service Date Service Provider Modifiers Qty    98420704222 HC PT THER PROC EA 15 MIN 3/20/2019 Trudy Thornton, MARIKA GP 2    88686231788 HC PT ELECTRICAL STIM UNATTENDED 3/20/2019 Trudy Thornton, PTA  1    86596973299 HC PT THER SUPP EA 15 MIN 3/20/2019 Trudy Thornton, MARIKA GP 1                    Trudy Thornton PTA  3/20/2019

## 2019-03-25 ENCOUNTER — HOSPITAL ENCOUNTER (OUTPATIENT)
Dept: PHYSICAL THERAPY | Facility: HOSPITAL | Age: 49
Setting detail: THERAPIES SERIES
Discharge: HOME | End: 2019-03-25

## 2019-03-25 DIAGNOSIS — G89.29 CHRONIC RIGHT SHOULDER PAIN: ICD-10-CM

## 2019-03-25 DIAGNOSIS — M25.511 CHRONIC RIGHT SHOULDER PAIN: ICD-10-CM

## 2019-03-25 DIAGNOSIS — M25.511 ACUTE PAIN OF RIGHT SHOULDER: ICD-10-CM

## 2019-03-25 DIAGNOSIS — S42.254D CLOSED NONDISPLACED FRACTURE OF GREATER TUBEROSITY OF RIGHT HUMERUS WITH ROUTINE HEALING, SUBSEQUENT ENCOUNTER: ICD-10-CM

## 2019-03-25 DIAGNOSIS — M75.41 SHOULDER IMPINGEMENT SYNDROME, RIGHT: ICD-10-CM

## 2019-03-25 DIAGNOSIS — S46.001D INJURY OF RIGHT ROTATOR CUFF, SUBSEQUENT ENCOUNTER: Primary | ICD-10-CM

## 2019-03-25 PROCEDURE — G0283 ELEC STIM OTHER THAN WOUND: HCPCS

## 2019-03-25 PROCEDURE — 97110 THERAPEUTIC EXERCISES: CPT

## 2019-03-25 NOTE — THERAPY TREATMENT NOTE
Outpatient Physical Therapy Ortho Treatment Note  [facility]     Patient Name: Jeremy Siu  : 1970  MRN: [id number]  Today's Date: 3/25/2019      Visit Date: 2019     Subjective Improvement 75%  Visits 6/6  Visits approved 8 from 3- to 2019  RTMD 2019  Recert Date 3-    Right Shoulder Humeral Head Impaction Fracture Greater Tuberosity on 2019      Visit Dx:    ICD-10-CM ICD-9-CM   1. Injury of right rotator cuff, subsequent encounter S46.001D V58.89     959.2   2. Acute pain of right shoulder M25.511 719.41   3. Chronic right shoulder pain M25.511 719.41    G89.29 338.29   4. Shoulder impingement syndrome, right M75.41 726.2   5. Closed nondisplaced fracture of greater tuberosity of right humerus with routine healing, subsequent encounter S42.254D V54.11       Patient Active Problem List   Diagnosis   • Acromioclavicular sprain   • Cigarette smoker   • Type 2 diabetes mellitus without complication, without long-term current use of insulin (CMS/HCC)   • Rotator cuff injury   • Chronic right shoulder pain   • Acute pain of right shoulder   • Nondisplaced fracture of greater tuberosity of right humerus with routine healing   • Shoulder impingement syndrome, right        Past Medical History:   Diagnosis Date   • Diabetes mellitus due to underlying condition with diabetic neuropathic arthropathy (CMS/HCC)    • Diabetic neuropathy (CMS/HCC)    • Male erectile disorder    • Myopia    • Tobacco dependence syndrome    • Type 2 diabetes mellitus without complications (CMS/[facility])         No past surgical history on file.                    PT Assessment/Plan     Row Name 19 0920          PT Assessment    Assessment Comments  Patient was less guarded with PROM this date but cont to express increase pain with ther ex.  -CP        PT Plan    PT Frequency  2x/week  -CP     Predicted Duration of Therapy Intervention (Therapy Eval)  6-8 weeks  -CP     PT Plan Comments   Cont with PROM.  recheck next visit  -CP       User Key  (r) = Recorded By, (t) = Taken By, (c) = Cosigned By    Initials Name Provider Type    CP Trduy Thornton, MARIKA Physical Therapy Assistant          Modalities     Row Name 03/25/19 0800             Ice    Ice Applied  Yes  -CP      Location  right shoulder  -CP      Rx Minutes  -- 20 minutes with IFC  -CP      Ice S/P Rx  Yes  -CP         ELECTRICAL STIMULATION    Attended/Unattended  Unattended  -CP      Stimulation Type  IFC  -CP      Location/Electrode Placement/Other  right Shoulder  -CP       PT E-Stim Unattended (Manual) Minutes  20  -CP        User Key  (r) = Recorded By, (t) = Taken By, (c) = Cosigned By    Initials Name Provider Type    CP Trudy Thornton, MARIKA Physical Therapy Assistant        Exercises     Row Name 03/25/19 0800             Subjective Comments    Subjective Comments  Patient states that his shoulder pain remain at around 7/10 at all times.  Sometimes his forgets and will use his arm more than he shoulld.  -CP         Subjective Pain    Able to rate subjective pain?  yes  -CP      Pre-Treatment Pain Level  7  -CP      Post-Treatment Pain Level  -- sore  -CP      Subjective Pain Comment  no pain pills  -CP         Exercise 1    Exercise Name 1  Pro II level 1  -CP      Time 1  5  -CP      Additional Comments  UE/LE  -CP         Exercise 2    Exercise Name 2  scap r ows with tband  -CP      Sets 2  3  -CP      Reps 2  10  -CP      Time 2  red   -CP         Exercise 3    Exercise Name 3  shoulder ext with tband  -CP      Sets 3  3  -CP      Reps 3  10  -CP      Time 3  red  -CP         Exercise 4    Exercise Name 4  supine AAROM with cane  -CP      Sets 4  3  -CP      Reps 4  10  -CP         Exercise 5    Exercise Name 5  supine AROM fl  -CP      Sets 5  3  -CP      Reps 5  10  -CP         Exercise 6    Exercise Name 6  supine scap  -CP      Sets 6  3  -CP      Reps 6  10  -CP         Exercise 7    Exercise Name 7  supine IR/ER   -CP      Sets 7  2  -CP      Reps 7  10  -CP         Exercise 8    Exercise Name 8  PROM right shoulder  -CP      Time 8  5  -CP        User Key  (r) = Recorded By, (t) = Taken By, (c) = Cosigned By    Initials Name Provider Type    CP Trudy Thornton PTA Physical Therapy Assistant                       PT OP Goals     Row Name 03/25/19 0900          PT Short Term Goals    STG Date to Achieve  03/26/19  -CP     STG 1  Note a >/= 25% subjective improvement.  -CP     STG 1 Progress  Not Met  -CP     STG 2  QuickDASH score to be </= 50.  -CP     STG 2 Progress  Not Met  -CP     STG 3  Passive R shoulder flexion to be >/= 140 degrees.  -CP     STG 3 Progress  Progressing  -CP     STG 4  Passive R shoulder abduction to be >/= 90 degrees.  -CP     STG 4 Progress  Progressing  -CP     STG 5  Supine R shoulder active flexion to be >/= 120 degrees.  -CP     STG 5 Progress  Progressing  -CP     STG 6  Active R shoulder ER in 45 degrees of abduction to be >/= 45 degrees.  -CP     STG 7  Active R shoulder IR in 45 degrees of abduction to be >/= 60 degrees.  -CP        Time Calculation    PT Goal Re-Cert Due Date  03/26/19  -CP       User Key  (r) = Recorded By, (t) = Taken By, (c) = Cosigned By    Initials Name Provider Type    CP Trudy Thornton PTA Physical Therapy Assistant                         Time Calculation:   Start Time: 0850  Stop Time: 0951  Time Calculation (min): 61 min  Total Timed Code Minutes- PT: 39 minute(s)  Therapy Charges for Today     Code Description Service Date Service Provider Modifiers Qty    22779031850 HC PT ELECTRICAL STIM UNATTENDED 3/25/2019 Trudy Thornton PTA  1    92939030279 HC PT THER PROC EA 15 MIN 3/25/2019 Trudy Thornton PTA GP 3                    Trudy Thornton PTA  3/25/2019

## 2019-03-27 ENCOUNTER — HOSPITAL ENCOUNTER (OUTPATIENT)
Dept: PHYSICAL THERAPY | Facility: HOSPITAL | Age: 49
Setting detail: THERAPIES SERIES
Discharge: HOME | End: 2019-03-27

## 2019-03-27 DIAGNOSIS — G89.29 CHRONIC RIGHT SHOULDER PAIN: ICD-10-CM

## 2019-03-27 DIAGNOSIS — S46.001D INJURY OF RIGHT ROTATOR CUFF, SUBSEQUENT ENCOUNTER: Primary | ICD-10-CM

## 2019-03-27 DIAGNOSIS — M25.511 ACUTE PAIN OF RIGHT SHOULDER: ICD-10-CM

## 2019-03-27 DIAGNOSIS — M25.511 CHRONIC RIGHT SHOULDER PAIN: ICD-10-CM

## 2019-03-27 DIAGNOSIS — S42.254D CLOSED NONDISPLACED FRACTURE OF GREATER TUBEROSITY OF RIGHT HUMERUS WITH ROUTINE HEALING, SUBSEQUENT ENCOUNTER: ICD-10-CM

## 2019-03-27 DIAGNOSIS — M75.41 SHOULDER IMPINGEMENT SYNDROME, RIGHT: ICD-10-CM

## 2019-03-27 PROCEDURE — 97110 THERAPEUTIC EXERCISES: CPT | Performed by: PHYSICAL THERAPIST

## 2019-03-27 NOTE — THERAPY PROGRESS REPORT/RE-CERT
"    Outpatient Physical Therapy Ortho Progress Note  [facility]     Patient Name: Jeremy Siu  : 1970  MRN: [id number]  Today's Date: 3/27/2019      Visit Date: 2019  Attendance:  (eval + 8 approved through 4/6/10\9)  Subjective Improvement: 85%  Next MD Appt: 19  Recert Date: 19     Therapy Diagnosis: R shoulder humeral head impaction fracture/greater tuberosity fracture, DOI: 18    Changes in Medications: insulin added to medication list; does not use pain medication  Changes in MD Orders: none noted  Number of Work Days Lost: unemployed at present       Past Medical History:   Diagnosis Date   • Diabetes mellitus due to underlying condition with diabetic neuropathic arthropathy (CMS/HCC)    • Diabetic neuropathy (CMS/HCC)    • Male erectile disorder    • Myopia    • Tobacco dependence syndrome    • Type 2 diabetes mellitus without complications (CMS/[facility])         No past surgical history on file.    Visit Dx:     ICD-10-CM ICD-9-CM   1. Injury of right rotator cuff, subsequent encounter S46.001D V58.89     959.2   2. Acute pain of right shoulder M25.511 719.41   3. Chronic right shoulder pain M25.511 719.41    G89.29 338.29   4. Shoulder impingement syndrome, right M75.41 726.2   5. Closed nondisplaced fracture of greater tuberosity of right humerus with routine healing, subsequent encounter S42.254D V54.11             PT Ortho     Row Name 19 0800       Subjective Comments    Subjective Comments  Patient reports that his shoulder is \"still a little sore.\" Continued pain trying to reach up, reaching behind his back, and lifting depending on how heavy the object is. Hurts to sleep on right side. Wears sling \"very seldom anymore.\" 85% subjective improvement. Insulin added to medication list.   -SS       Precautions and Contraindications    Precautions  impaction fracture superior lateral and posterior humeral head, diabetic  -SS       Subjective Pain    Able to rate " subjective pain?  yes  -    Pre-Treatment Pain Level  5  -    Post-Treatment Pain Level  3  -    Subjective Pain Comment  patient declines ice this date  -       Posture/Observations    Posture/Observations Comments  Patient presents without sling.   -SS       Right Upper Ext    Rt Shoulder Abduction AROM  standing 85 with superior shoulder pain; supine 82 with pain; scapular elevation both standing and supine  -    Rt Shoulder Abduction PROM  105  -    Rt Shoulder Extension AROM  56  -    Rt Shoulder Flexion AROM  standing 105 with pain; supine 122 with end-range pain  -    Rt Shoulder Flexion PROM  127  -SS    Rt Shoulder External Rotation AROM  44 when measured in supine with shoulder abducted 45 degrees; 42 in supine 90/90 position  -SS    Rt Shoulder External Rotation PROM  44 when measured in supine with shoulder abducted 45 degrees; 47 in supine 90/90 position  -    Rt Shoulder Internal Rotation AROM  50 when measured in supine with shoulder abducted 45 degrees; 25 in supine 90/90 position  -SS    Rt Shoulder Internal Rotation PROM  35 when measured in supine with shoulder abducted 45 degrees; 25 in supine 90/90 position  -      User Key  (r) = Recorded By, (t) = Taken By, (c) = Cosigned By    Initials Name Provider Type    Don Byrd, PT DPT Physical Therapist        Therapy Education  Education Details: progress  Given: HEP  Program: Reinforced  How Provided: Verbal  Provided to: Patient  Level of Understanding: Verbalized     PT OP Goals     Row Name 03/27/19 0800       PT Short Term Goals    STG Date to Achieve  04/17/19  -    STG 1  Note a >/= 25% subjective improvement.  -    STG 1 Progress  Met  -    STG 2  QuickDASH score to be </= 50.  -    STG 2 Progress  Met  -    STG 3  Passive R shoulder flexion to be >/= 140 degrees.  -    STG 3 Progress  Not Met  -    STG 4  Passive R shoulder abduction to be >/= 90 degrees.  -    STG 4 Progress  Met  -     "STG 5  Supine R shoulder active flexion to be >/= 120 degrees.  -    STG 5 Progress  Met  -    Additional STG's  --    STG 6  Active R shoulder ER in 45 degrees of abduction to be >/= 45 degrees.  -    STG 6 Progress  Not Met  -    STG 7  Active R shoulder IR in 45 degrees of abduction to be >/= 60 degrees.  -    STG 7 Progress  Not Met  -       Long Term Goals    LTG Date to Achieve  -- to be determined  -       Time Calculation    PT Goal Re-Cert Due Date  04/17/19  -      User Key  (r) = Recorded By, (t) = Taken By, (c) = Cosigned By    Initials Name Provider Type     Don Castellano, PT DPT Physical Therapist          PT Assessment/Plan     Row Name 03/27/19 0800          Functional Limitations  Limitation in home management;Limitations in community activities;Limitations in functional capacity and performance;Performance in leisure activities;Performance in self-care ADL;Performance in work activities  -    Impairments  Pain;Range of motion;Muscle strength  -    Assessment Comments  ROM slowly improving. Continues to have pain at end-ranges. Gives good effort. Using ibuprofen for pain control.  -    Rehab Potential  Good barrier: healing fracture  -    Patient/caregiver participated in establishment of treatment plan and goals  Yes  -    Patient would benefit from skilled therapy intervention  Yes  -          PT Frequency  2x/week  -    Predicted Duration of Therapy Intervention (Therapy Eval)  3-4 weeks  -    PT Plan Comments  A/AA/PROM, gentle stretching, gentle strengthening, ice  -      User Key  (r) = Recorded By, (t) = Taken By, (c) = Cosigned By    Initials Name Provider Type     Don Castellano, PT DPT Physical Therapist            Exercises     Row Name 03/27/19 0800          Existing Precautions/Restrictions  -- impaction fracture superior lateral and posterior humeral he  -          Subjective Comments  Patient reports that his shoulder is \"still " "a little sore.\" Continued pain trying to reach up, reaching behind his back, and lifting depending on how heavy the object is. Hurts to sleep on right side. Wears sling \"very seldom anymore.\" 85% subjective improvement. Insulin added to medication list.   -SS          Able to rate subjective pain?  yes  -SS    Pre-Treatment Pain Level  5  -SS    Post-Treatment Pain Level  3  -SS    Subjective Pain Comment  patient declines ice this date  -SS          Exercise Name 1  Pro2, Seat 10, U/LE, F/B  -SS    Cueing 1  Verbal  -SS    Time 1  10 mins  -SS    Additional Comments  Level 2  -SS          Exercise Name 2  Pulley flexion & abduction  -SS    Cueing 2  Verbal;Demo  -SS    Reps 2  20 each  -SS          Exercise Name 3  Supine clasped hand AA flexion  -SS    Cueing 3  Verbal;Demo  -SS    Reps 3  30  -SS          Exercise Name 4  Sidelying shoulder abduction AROM  -SS    Cueing 4  Verbal;Tactile  -SS    Sets 4  20  -SS          Exercise Name 5  SLER  -SS    Cueing 5  Verbal;Tactile  -SS    Reps 5  20  -SS          Exercise Name 6  Scapular elevation -> protraction -> depression -> retraction  -SS    Cueing 6  Verbal;Tactile;Demo  -SS    Reps 6  10  -SS      User Key  (r) = Recorded By, (t) = Taken By, (c) = Cosigned By    Initials Name Provider Type    Don Byrd, PT DPT Physical Therapist                        Outcome Measure Options: Quick DASH  Quick DASH  Open a tight or new jar.: Mild Difficulty  Do heavy household chores (e.g., wash walls, wash floors): Moderate Difficulty  Carry a shopping bag or briefcase: Moderate Difficulty  Wash your back: Severe Difficulty  Use a knife to cut food: Mild Difficulty  Recreational activities in which you take some force or impact through your arm, should or hand (e.g. golf, hammering, tennis, etc.): Severe Difficulty  During the past week, to what extent has your arm, shoulder, or hand problem interfered with your normal social activities with family, friends, " neighbors or groups?: Moderately  During the past week, were you limited in your work or other regular daily activities as a result of your arm, shoulder or hand problem?: Moderately Limited  Arm, Shoulder, or hand pain: Mild  Tingling (pins and needles) in your arm, shoulder, or hand: Mild  During the past week, how much difficulty have you had sleeping because of the pain in your arm, shoulder or hand?: Moderate Difficultly  Number of Questions Answered: 11  Quick DASH Score: 45.45         Time Calculation:     Start Time: 0848  Stop Time: 0930  Time Calculation (min): 42 min     Therapy Charges for Today     Code Description Service Date Service Provider Modifiers Qty    22975847506 HC PT THER PROC EA 15 MIN 3/27/2019 Don Castellano, PT DPT GP 3                   Don Castellano, PT, DPT, CHT  3/27/2019

## 2019-04-01 ENCOUNTER — HOSPITAL ENCOUNTER (OUTPATIENT)
Dept: PHYSICAL THERAPY | Facility: HOSPITAL | Age: 49
Setting detail: THERAPIES SERIES
Discharge: HOME | End: 2019-04-01

## 2019-04-01 ENCOUNTER — DOCUMENTATION (OUTPATIENT)
Dept: PHYSICAL THERAPY | Facility: HOSPITAL | Age: 49
End: 2019-04-01

## 2019-04-01 DIAGNOSIS — S42.254D CLOSED NONDISPLACED FRACTURE OF GREATER TUBEROSITY OF RIGHT HUMERUS WITH ROUTINE HEALING, SUBSEQUENT ENCOUNTER: ICD-10-CM

## 2019-04-01 DIAGNOSIS — M75.41 SHOULDER IMPINGEMENT SYNDROME, RIGHT: ICD-10-CM

## 2019-04-01 DIAGNOSIS — G89.29 CHRONIC RIGHT SHOULDER PAIN: ICD-10-CM

## 2019-04-01 DIAGNOSIS — M25.511 CHRONIC RIGHT SHOULDER PAIN: ICD-10-CM

## 2019-04-01 DIAGNOSIS — M25.511 ACUTE PAIN OF RIGHT SHOULDER: ICD-10-CM

## 2019-04-01 DIAGNOSIS — S46.001D INJURY OF RIGHT ROTATOR CUFF, SUBSEQUENT ENCOUNTER: Primary | ICD-10-CM

## 2019-04-01 PROCEDURE — 97110 THERAPEUTIC EXERCISES: CPT

## 2019-04-01 NOTE — THERAPY TREATMENT NOTE
Outpatient Physical Therapy Ortho Treatment Note  [facility]     Patient Name: Jeremy Siu  : 1970  MRN: [id number]  Today's Date: 2019      Visit Date: 2019     Subjective Improvement 95%  Visits 8/8  Visits approved eval plus 8 to 2019  RTMD 2019  Recert Date 2019    Visit Dx:    ICD-10-CM ICD-9-CM   1. Injury of right rotator cuff, subsequent encounter S46.001D V58.89     959.2   2. Acute pain of right shoulder M25.511 719.41   3. Chronic right shoulder pain M25.511 719.41    G89.29 338.29   4. Shoulder impingement syndrome, right M75.41 726.2   5. Closed nondisplaced fracture of greater tuberosity of right humerus with routine healing, subsequent encounter S42.254D V54.11       Patient Active Problem List   Diagnosis   • Acromioclavicular sprain   • Cigarette smoker   • Type 2 diabetes mellitus without complication, without long-term current use of insulin (CMS/HCC)   • Rotator cuff injury   • Chronic right shoulder pain   • Acute pain of right shoulder   • Nondisplaced fracture of greater tuberosity of right humerus with routine healing   • Shoulder impingement syndrome, right        Past Medical History:   Diagnosis Date   • Diabetes mellitus due to underlying condition with diabetic neuropathic arthropathy (CMS/HCC)    • Diabetic neuropathy (CMS/HCC)    • Male erectile disorder    • Myopia    • Tobacco dependence syndrome    • Type 2 diabetes mellitus without complications (CMS/HCC)         No past surgical history on file.                    PT Assessment/Plan     Row Name 19 1007          PT Assessment    Assessment Comments  Tolerated new RX well.  Patient does have pain at end range.  -CP        PT Plan    PT Frequency  2x/week  -CP     Predicted Duration of Therapy Intervention (Therapy Eval)  3-4 weeks  -CP     PT Plan Comments  Cont with POC.  One more approved visit.  Will need to request additional visits  -CP       User Key  (r) = Recorded By,  (t) = Taken By, (c) = Cosigned By    Initials Name Provider Type    CP Trudy Thornton PTA Physical Therapy Assistant          Modalities     Row Name 04/01/19 0900             Ice    Ice Applied  Yes  -CP      Location  right shoulder  -CP      Rx Minutes  15 mins  -CP      Ice S/P Rx  Yes  -CP        User Key  (r) = Recorded By, (t) = Taken By, (c) = Cosigned By    Initials Name Provider Type    Trudy Tijerina PTA Physical Therapy Assistant        Exercises     Row Name 04/01/19 0900             Subjective Comments    Subjective Comments  Patient states that he is 95%.  However he has been told by nurses that he may never get to use his arm like he did before  -CP         Subjective Pain    Able to rate subjective pain?  yes  -CP      Pre-Treatment Pain Level  4  -CP      Post-Treatment Pain Level  4  -CP         Exercise 1    Exercise Name 1  Pro II level 3  -CP      Time 1  10  -CP      Additional Comments  UE/LE  -CP         Exercise 2    Exercise Name 2  Pulleys fl and Ab  -CP      Reps 2  30  -CP         Exercise 3    Exercise Name 3  seated split table wipes  -CP      Sets 3  3  -CP      Reps 3  10  -CP         Exercise 4    Exercise Name 4  standing fl to 90  -CP      Sets 4  3  -CP      Reps 4  10  -CP         Exercise 5    Exercise Name 5  standing scap to 90  -CP      Sets 5  3  -CP      Reps 5  10  -CP         Exercise 6    Exercise Name 6  SL'ing AD  -CP      Sets 6  2  -CP      Reps 6  10  -CP         Exercise 7    Exercise Name 7  SL'ing ER  -CP      Sets 7  2  -CP      Reps 7  10  -CP         Exercise 8    Exercise Name 8  PROM  -CP      Time 8  5  -CP        User Key  (r) = Recorded By, (t) = Taken By, (c) = Cosigned By    Initials Name Provider Type    CP Trudy Thornton PTA Physical Therapy Assistant                       PT OP Goals     Row Name 04/01/19 1000          PT Short Term Goals    STG Date to Achieve  04/17/19  -CP     STG 1  Note a >/= 25% subjective improvement.  -CP      STG 1 Progress  Met  -CP     STG 2  QuickDASH score to be </= 50.  -CP     STG 2 Progress  Met  -CP     STG 3  Passive R shoulder flexion to be >/= 140 degrees.  -CP     STG 3 Progress  Not Met  -CP     STG 4  Passive R shoulder abduction to be >/= 90 degrees.  -CP     STG 4 Progress  Met  -CP     STG 5  Supine R shoulder active flexion to be >/= 120 degrees.  -CP     STG 5 Progress  Met  -CP     STG 6  Active R shoulder ER in 45 degrees of abduction to be >/= 45 degrees.  -CP     STG 6 Progress  Not Met  -CP     STG 7  Active R shoulder IR in 45 degrees of abduction to be >/= 60 degrees.  -CP     STG 7 Progress  Not Met  -CP        Long Term Goals    LTG Date to Achieve  -- to be determined  -CP        Time Calculation    PT Goal Re-Cert Due Date  04/17/19  -CP       User Key  (r) = Recorded By, (t) = Taken By, (c) = Cosigned By    Initials Name Provider Type    CP Trudy Thornton PTA Physical Therapy Assistant                         Time Calculation:   Start Time: 0930  Stop Time: 1030  Time Calculation (min): 60 min  Total Timed Code Minutes- PT: 45 minute(s)  Therapy Charges for Today     Code Description Service Date Service Provider Modifiers Qty    27270914406 HC PT THER SUPP EA 15 MIN 4/1/2019 Trudy Thornton PTA GP 1    95983046266 HC PT THER PROC EA 15 MIN 4/1/2019 Trudy Thornton PTA GP 3                    Trudy Thornton PTA  4/1/2019

## 2019-04-03 DIAGNOSIS — S42.254D CLOSED NONDISPLACED FRACTURE OF GREATER TUBEROSITY OF RIGHT HUMERUS WITH ROUTINE HEALING, SUBSEQUENT ENCOUNTER: Primary | ICD-10-CM

## 2019-04-04 ENCOUNTER — HOSPITAL ENCOUNTER (OUTPATIENT)
Dept: PHYSICAL THERAPY | Facility: HOSPITAL | Age: 49
Setting detail: THERAPIES SERIES
Discharge: HOME | End: 2019-04-04

## 2019-04-04 ENCOUNTER — OFFICE VISIT (OUTPATIENT)
Dept: ORTHOPEDIC SURGERY | Facility: CLINIC | Age: 49
End: 2019-04-04

## 2019-04-04 VITALS — HEIGHT: 73 IN | WEIGHT: 287.4 LBS | BODY MASS INDEX: 38.09 KG/M2

## 2019-04-04 DIAGNOSIS — M75.41 SHOULDER IMPINGEMENT SYNDROME, RIGHT: ICD-10-CM

## 2019-04-04 DIAGNOSIS — S42.254D CLOSED NONDISPLACED FRACTURE OF GREATER TUBEROSITY OF RIGHT HUMERUS WITH ROUTINE HEALING, SUBSEQUENT ENCOUNTER: Primary | ICD-10-CM

## 2019-04-04 DIAGNOSIS — G89.29 CHRONIC RIGHT SHOULDER PAIN: ICD-10-CM

## 2019-04-04 DIAGNOSIS — S42.254D CLOSED NONDISPLACED FRACTURE OF GREATER TUBEROSITY OF RIGHT HUMERUS WITH ROUTINE HEALING, SUBSEQUENT ENCOUNTER: ICD-10-CM

## 2019-04-04 DIAGNOSIS — M25.511 CHRONIC RIGHT SHOULDER PAIN: ICD-10-CM

## 2019-04-04 DIAGNOSIS — S46.001D INJURY OF RIGHT ROTATOR CUFF, SUBSEQUENT ENCOUNTER: Primary | ICD-10-CM

## 2019-04-04 DIAGNOSIS — M25.511 ACUTE PAIN OF RIGHT SHOULDER: ICD-10-CM

## 2019-04-04 DIAGNOSIS — S46.001D INJURY OF RIGHT ROTATOR CUFF, SUBSEQUENT ENCOUNTER: ICD-10-CM

## 2019-04-04 PROCEDURE — 99213 OFFICE O/P EST LOW 20 MIN: CPT | Performed by: ORTHOPAEDIC SURGERY

## 2019-04-04 PROCEDURE — 97110 THERAPEUTIC EXERCISES: CPT

## 2019-04-04 RX ORDER — INSULIN GLARGINE 100 [IU]/ML
36 INJECTION, SOLUTION SUBCUTANEOUS NIGHTLY
Refills: 2 | COMMUNITY
Start: 2019-03-26 | End: 2020-08-26 | Stop reason: SDUPTHER

## 2019-04-04 NOTE — PROGRESS NOTES
The patient is a 48 y.o. male who presents for followup.    Chief Complaint   Patient presents with   • Right Shoulder - Follow-up     Xray today       HPI:  Follow up right greater tuberosity fracture.  Xray today.      Current Outpatient Medications:   •  amitriptyline (ELAVIL) 10 MG tablet, Take 10 mg by mouth Every Night., Disp: , Rfl:   •  aspirin 81 MG EC tablet, Take 81 mg by mouth Daily., Disp: , Rfl:   •  atorvastatin (LIPITOR) 40 MG tablet, Take 40 mg by mouth Daily., Disp: , Rfl:   •  cyclobenzaprine (FLEXERIL) 10 MG tablet, Take 1 tablet by mouth 3 (Three) Times a Day As Needed for muscle spasms., Disp: 90 tablet, Rfl: 3  •  gabapentin (NEURONTIN) 300 MG capsule, Take 1 capsule by mouth 3 (Three) Times a Day. 1 CAP IN THE MORNING AND 2 CAPS BED (Patient taking differently: Take 600 mg by mouth 3 (Three) Times a Day. 1 CAP IN THE MORNING AND 2 CAPS BED), Disp: 90 capsule, Rfl: 5  •  glipiZIDE (GLUCOTROL) 10 MG tablet, Take 10 mg by mouth Daily., Disp: , Rfl:   •  linagliptin (TRADJENTA) 5 MG tablet tablet, Take 1 tablet by mouth Daily., Disp: 30 tablet, Rfl: 5  •  lisinopril (PRINIVIL,ZESTRIL) 5 MG tablet, Take 5 mg by mouth Daily., Disp: , Rfl:   •  meloxicam (MOBIC) 15 MG tablet, Take 1 tablet by mouth Daily for 30 days., Disp: 30 tablet, Rfl: 0  •  metFORMIN (GLUCOPHAGE) 1000 MG tablet, Take 1 tablet by mouth 2 (Two) Times a Day With Meals., Disp: 60 tablet, Rfl: 5  •  omega-3 acid ethyl esters (LOVAZA) 1 g capsule, Take 2 g by mouth 2 (Two) Times a Day., Disp: , Rfl:   •  Omega-3 Fatty Acids (OMEGA 3 PO), Take  by mouth., Disp: , Rfl:   •  omeprazole (priLOSEC) 20 MG capsule, Take 20 mg by mouth Daily., Disp: , Rfl:   •  pravastatin (PRAVACHOL) 20 MG tablet, Take 20 mg by mouth Daily., Disp: , Rfl:   •  traMADol (ULTRAM) 50 MG tablet, 1-2 tabs po q eight hour prn pain, Disp: 40 tablet, Rfl: 0    No Known Allergies     ROS:  No fevers or chills.  No nausea or vomiting    PHYSICAL EXAM:    There were no  vitals filed for this visit.    GAIT:     [x]  Normal  []  Antalgic    Assistive device: [x]  None  []  Walker     []  Crutches  []  Cane     []  Wheelchair  []  Stretcher    Patient is awake and alert, answers questions appropriately, and is in no apparent distress.      No results found.    ASSESSMENT:  Diagnoses and all orders for this visit:    Closed nondisplaced fracture of greater tuberosity of right humerus with routine healing, subsequent encounter    Injury of right rotator cuff, subsequent encounter        PLAN:    No Follow-up on file.    Luda Navarro, CSA

## 2019-04-04 NOTE — THERAPY TREATMENT NOTE
Outpatient Physical Therapy Ortho Treatment Note  [facility]     Patient Name: Jeremy Siu  : 1970  MRN: [id number]  Today's Date: 2019      Visit Date: 2019    Sub imp 90%  Visits   MD 19  Re 19    Visit Dx:    ICD-10-CM ICD-9-CM   1. Injury of right rotator cuff, subsequent encounter S46.001D V58.89     959.2   2. Acute pain of right shoulder M25.511 719.41   3. Chronic right shoulder pain M25.511 719.41    G89.29 338.29   4. Shoulder impingement syndrome, right M75.41 726.2   5. Closed nondisplaced fracture of greater tuberosity of right humerus with routine healing, subsequent encounter S42.254D V54.11       Patient Active Problem List   Diagnosis   • Acromioclavicular sprain   • Cigarette smoker   • Type 2 diabetes mellitus without complication, without long-term current use of insulin (CMS/[facility])   • Rotator cuff injury   • Chronic right shoulder pain   • Acute pain of right shoulder   • Nondisplaced fracture of greater tuberosity of right humerus with routine healing   • Shoulder impingement syndrome, right        Past Medical History:   Diagnosis Date   • Diabetes mellitus due to underlying condition with diabetic neuropathic arthropathy (CMS/HCC)    • Diabetic neuropathy (CMS/[facility])    • Male erectile disorder    • Myopia    • Tobacco dependence syndrome    • Type 2 diabetes mellitus without complications (CMS/HCC)         No past surgical history on file.    PT Ortho     Row Name 19 1100       Right Upper Ext    Rt Shoulder Abduction AROM  Standing 120  (Pended)   -KAMRYN    Rt Shoulder Abduction PROM  120  (Pended)   -KAMRYN    Rt Shoulder Flexion AROM  standing 125  (Pended)   -KAMRYN    Rt Shoulder Flexion PROM  135  (Pended)   -KAMRYN      User Key  (r) = Recorded By, (t) = Taken By, (c) = Cosigned By    Initials Name Provider Type    Josep Lee, ATC                       PT Assessment/Plan     Row Name 19 0638          PT Assessment     Assessment Comments  Added kenya Castellano PT. Slightly improved ROM. Pt. declined Ice post Rx.   (Pended)   -        PT Plan    PT Frequency  2x/week  (Pended)   -     Predicted Duration of Therapy Intervention (Therapy Eval)  3-4 weeks  (Pended)   -     PT Plan Comments  Cont per POC pending approval of more PT visits. Pt. to call morning of next scheduled apt to see if visits approved.   (Pended)   -       User Key  (r) = Recorded By, (t) = Taken By, (c) = Cosigned By    Initials Name Provider Type    Josep Lee, [facility]           Modalities     Row Name 04/04/19 1100             Ice    Patient denies application of Ice  Yes  (Pended)   -        User Key  (r) = Recorded By, (t) = Taken By, (c) = Cosigned By    Initials Name Provider Type    Josep Lee, [employer]         Exercises     Row Name 04/04/19 1000             Subjective Comments    Subjective Comments  Reports saw MD today. To continue with PT. R shoulder actually feels pretty good today.   (Pended)   -         Subjective Pain    Able to rate subjective pain?  yes  (Pended)   -KAMRYN      Pre-Treatment Pain Level  3  (Pended)   -KAMRYN      Post-Treatment Pain Level  4  (Pended)   -KAMRYN         Exercise 1    Exercise Name 1  Pro II level 3  (Pended)   -      Time 1  10  (Pended)   -KAMRYN         Exercise 2    Exercise Name 2  Pulleys fl and Ab  (Pended)   -KAMRYN      Reps 2  30  (Pended)   -KAMRYN         Exercise 3    Exercise Name 3  Wall slide  (Pended)   -KAMRYN      Sets 3  2  (Pended)   -KAMRYN      Reps 3  10  (Pended)   -KAMRYN         Exercise 4    Exercise Name 4  Table walkaway  (Pended)   -KAMRYN      Sets 4  1  (Pended)   -KAMRYN      Reps 4  15  (Pended)   -KAMRYN         Exercise 5    Exercise Name 5  Supine wand flex  (Pended)   -KAMRYN      Sets 5  2  (Pended)   -KAMRYN      Reps 5  10  (Pended)   -KAMRYN         Exercise 6    Exercise Name 6  SL'ing Abd  (Pended)   -KAMRYN      Sets 6  2  (Pended)   -KAMRYN      Reps 6  10  (Pended)   -KAMRYN          Exercise 7    Exercise Name 7  Jt. mobes Inf/post  (Pended)   -KAMRYN      Sets 7  --  (Pended)   -KAMRYN      Reps 7  --  (Pended)   -KAMRYN      Time 7  5'  (Pended)   -      Additional Comments  Grade II  (Pended)   -KAMRYN         Exercise 8    Exercise Name 8  PROM flex/Abd  (Pended)   -KAMRYN      Time 8  5'  (Pended)   -         Exercise 9    Exercise Name 9  SLER  (Pended)   -KAMRYN      Sets 9  2  (Pended)   -KAMRYN      Reps 9  10  (Pended)   -        User Key  (r) = Recorded By, (t) = Taken By, (c) = Cosigned By    Initials Name Provider Type    Josep Lee, [facility]                        PT OP Goals     Row Name 04/04/19 1000          PT Short Term Goals    STG Date to Achieve  04/17/19  (Pended)   -KAMRYN     STG 1  Note a >/= 25% subjective improvement.  (Pended)   -KAMRYN     STG 1 Progress  Met  (Pended)   -KAMRYN     STG 2  QuickDASH score to be </= 50.  (Pended)   -KAMRYN     STG 2 Progress  Met  (Pended)   -KAMRYN     STG 3  Passive R shoulder flexion to be >/= 140 degrees.  (Pended)   -KAMRYN     STG 3 Progress  Not Met  (Pended)   -KAMRYN     STG 4  Passive R shoulder abduction to be >/= 90 degrees.  (Pended)   -KAMRYN     STG 4 Progress  Met  (Pended)   -KAMRYN     STG 5  Supine R shoulder active flexion to be >/= 120 degrees.  (Pended)   -KAMRYN     STG 5 Progress  Met  (Pended)   -KAMRYN     STG 6  Active R shoulder ER in 45 degrees of abduction to be >/= 45 degrees.  (Pended)   -KAMRYN     STG 6 Progress  Not Met  (Pended)   -KAMRYN     STG 7  Active R shoulder IR in 45 degrees of abduction to be >/= 60 degrees.  (Pended)   -KAMRYN     STG 7 Progress  Not Met  (Pended)   -        Long Term Goals    LTG Date to Achieve  --  (Pended)  to be determined  -       User Key  (r) = Recorded By, (t) = Taken By, (c) = Cosigned By    Initials Name Provider Type    Josep Lee [facility]           Therapy Education  Given: (P) HEP  Program: (P) Reinforced  How Provided: (P) Verbal  Provided to: (P) Patient  Level of Understanding: (P)  Verbalized              Time Calculation:   Start Time: (P) 1053  Stop Time: (P) 1140  Time Calculation (min): (P) 47 min  Total Timed Code Minutes- PT: (P) 47 minute(s)  Therapy Charges for Today     Code Description Service Date Service Provider Modifiers Qty    31037305065 HC PT THER PROC EA 15 MIN 4/4/2019 Josep Yap, ATC  3                    Josep Yap ATC  4/4/2019

## 2019-04-04 NOTE — PROGRESS NOTES
"Jeremy Siu is a 48 y.o. male returns for     Chief Complaint   Patient presents with   • Right Shoulder - Follow-up     Xray today       HISTORY OF PRESENT ILLNESS:  Follow up right greater tuberosity fracture.  Xray today.  Tells me he is doing a little bit better the therapist also notes improvement.  He notices loss of motion.  Injury happened originally in December.       CONCURRENT MEDICAL HISTORY:    The following portions of the patient's history were reviewed and updated as appropriate: allergies, current medications, past family history, past medical history, past social history, past surgical history and problem list.     ROS  No fevers or chills.  No chest pain or shortness of air.  No GI or  disturbances.  No cardiac issues noted.  All other systems are reported negative or without change.    PHYSICAL EXAMINATION:       Ht 185.4 cm (73\")   Wt 130 kg (287 lb 6.4 oz)   BMI 37.92 kg/m²     Physical Exam   Constitutional: He is oriented to person, place, and time. He appears well-developed.   HENT:   Head: Normocephalic and atraumatic.   Eyes: EOM are normal. Pupils are equal, round, and reactive to light.   Neck: Neck supple. No tracheal deviation present.   Pulmonary/Chest: Effort normal.   Musculoskeletal: He exhibits tenderness. He exhibits no edema or deformity.   Neurological: He is alert and oriented to person, place, and time.   Skin: Skin is warm and dry. No erythema.   Psychiatric: He has a normal mood and affect.       GAIT:     [x]  Normal  []  Antalgic    Assistive device: [x]  None  []  Walker     []  Crutches  []  Cane     []  Wheelchair  []  Stretcher    Ortho Exam  Decrease external rotation, abduction as well as forward flexion.  Minimal pain resistance of the infraspinatus supraspinatus is mild pain.  Neurologically intact    No results found.    Three-view x-rays showed a pretty mild sclerosis with no displacement of the greater tuberosity.      ASSESSMENT:    Diagnoses and " all orders for this visit:    Closed nondisplaced fracture of greater tuberosity of right humerus with routine healing, subsequent encounter  -     Ambulatory Referral to Physical Therapy Evaluate and treat (Patient has frozen shoulder.  Work on ROM)    Injury of right rotator cuff, subsequent encounter  -     Ambulatory Referral to Physical Therapy Evaluate and treat (Patient has frozen shoulder.  Work on ROM)    Other orders  -     Insulin Glargine (BASAGLAR KWIKPEN) 100 UNIT/ML injection pen; INJECT 20 UNITS SUBCUTANEOUSLY AT BEDTIME          PLAN think the issue he has now is really a frozen shoulder.  It is slightly restricted this may be made worse by his history of diabetes.  I want him to work on this I written a note to the therapist will check him back in a month.  This may be something for a may need some sort of work conditioning program as well getting back.  I may consider an intra-articular injection if still symptomatic upon return    No Follow-up on file.        This document has been electronically signed by Jaden Aleman MD on April 4, 2019 12:11 PM

## 2019-04-08 ENCOUNTER — HOSPITAL ENCOUNTER (OUTPATIENT)
Dept: PHYSICAL THERAPY | Facility: HOSPITAL | Age: 49
Setting detail: THERAPIES SERIES
Discharge: HOME | End: 2019-04-08

## 2019-04-08 DIAGNOSIS — S42.254D CLOSED NONDISPLACED FRACTURE OF GREATER TUBEROSITY OF RIGHT HUMERUS WITH ROUTINE HEALING, SUBSEQUENT ENCOUNTER: ICD-10-CM

## 2019-04-08 DIAGNOSIS — S46.001D INJURY OF RIGHT ROTATOR CUFF, SUBSEQUENT ENCOUNTER: Primary | ICD-10-CM

## 2019-04-08 DIAGNOSIS — G89.29 CHRONIC RIGHT SHOULDER PAIN: ICD-10-CM

## 2019-04-08 DIAGNOSIS — M25.511 ACUTE PAIN OF RIGHT SHOULDER: ICD-10-CM

## 2019-04-08 DIAGNOSIS — M25.511 CHRONIC RIGHT SHOULDER PAIN: ICD-10-CM

## 2019-04-08 DIAGNOSIS — M75.41 SHOULDER IMPINGEMENT SYNDROME, RIGHT: ICD-10-CM

## 2019-04-08 PROCEDURE — 97110 THERAPEUTIC EXERCISES: CPT

## 2019-04-08 NOTE — THERAPY TREATMENT NOTE
Outpatient Physical Therapy Ortho Treatment Note  [facility]     Patient Name: Jeremy Siu  : 1970  MRN: [id number]  Today's Date: 2019      Visit Date: 2019     Subjective Improvement 95%  Visits 10/10  Visits approved 4 additional visits from 2019 to 2019  RTMD 2019  Recert 2019    Right shoulder pain    Visit Dx:    ICD-10-CM ICD-9-CM   1. Injury of right rotator cuff, subsequent encounter S46.001D V58.89     959.2   2. Acute pain of right shoulder M25.511 719.41   3. Chronic right shoulder pain M25.511 719.41    G89.29 338.29   4. Shoulder impingement syndrome, right M75.41 726.2   5. Closed nondisplaced fracture of greater tuberosity of right humerus with routine healing, subsequent encounter S42.254D V54.11       Patient Active Problem List   Diagnosis   • Acromioclavicular sprain   • Cigarette smoker   • Type 2 diabetes mellitus without complication, without long-term current use of insulin (CMS/[facility])   • Rotator cuff injury   • Chronic right shoulder pain   • Acute pain of right shoulder   • Nondisplaced fracture of greater tuberosity of right humerus with routine healing   • Shoulder impingement syndrome, right        Past Medical History:   Diagnosis Date   • Diabetes mellitus due to underlying condition with diabetic neuropathic arthropathy (CMS/HCC)    • Diabetic neuropathy (CMS/HCC)    • Male erectile disorder    • Myopia    • Tobacco dependence syndrome    • Type 2 diabetes mellitus without complications (CMS/[facility])         No past surgical history on file.                    PT Assessment/Plan     Row Name 19 1008          PT Assessment    Assessment Comments  Pain at end range with A/AA/PROM for all motions.  Increase subjective improvement.  -CP        PT Plan    PT Frequency  2x/week  -CP     Predicted Duration of Therapy Intervention (Therapy Eval)  3-4 weeks  -CP     PT Plan Comments  Cont with POC.  IR stretch with strap.  supine cane ER  stretch  -CP       User Key  (r) = Recorded By, (t) = Taken By, (c) = Cosigned By    Initials Name Provider Type    CP Trudy Thornton, MARIKA Physical Therapy Assistant          Modalities     Row Name 04/08/19 0900             Ice    Ice Applied  Yes  -CP      Location  right shoulder  -CP      Rx Minutes  15 mins  -CP      Ice S/P Rx  Yes  -CP        User Key  (r) = Recorded By, (t) = Taken By, (c) = Cosigned By    Initials Name Provider Type    CP Trudy Thornton, MARIKA Physical Therapy Assistant        Exercises     Row Name 04/08/19 0900             Subjective Comments    Subjective Comments  Patient had increase pain after last PT visit however today shoulder is feeling good.  He is able to do what he needs to do.  He does have some pain reaching up.  He reports that he is 95% better  -CP         Subjective Pain    Able to rate subjective pain?  yes  -CP      Pre-Treatment Pain Level  2  -CP      Post-Treatment Pain Level  2  -CP         Exercise 1    Exercise Name 1  Pro II level 3  -CP      Time 1  10  -CP      Additional Comments  FW/BW  -CP         Exercise 2    Exercise Name 2  Pulleys fl and ab  -CP      Reps 2  30  -CP         Exercise 3    Exercise Name 3  wall slides with stretch at end  -CP      Sets 3  3  -CP      Reps 3  10  -CP         Exercise 4    Exercise Name 4  table walkaway  -CP      Sets 4  2  -CP      Reps 4  10  -CP         Exercise 5    Exercise Name 5  supine wand fl  -CP      Sets 5  2  -CP      Reps 5  10  -CP      Time 5  1 lb wand  -CP         Exercise 6    Exercise Name 6  SL'ing AB  -CP      Sets 6  2  -CP      Reps 6  10  -CP         Exercise 7    Exercise Name 7  PROM  -CP      Time 7  8  -CP         Exercise 8    Exercise Name 8  Jt mobs inf and post  -CP      Time 8  5  -CP      Additional Comments  grade II  -CP         Exercise 9    Exercise Name 9  SL ER  -CP      Sets 9  2  -CP      Reps 9  10  -CP        User Key  (r) = Recorded By, (t) = Taken By, (c) = Cosigned By     Initials Name Provider Type    CP Trudy Thornton PTA Physical Therapy Assistant                       PT OP Goals     Row Name 04/08/19 1000          PT Short Term Goals    STG Date to Achieve  04/17/19  -CP     STG 1  Note a >/= 25% subjective improvement.  -CP     STG 1 Progress  Met  -CP     STG 2  QuickDASH score to be </= 50.  -CP     STG 2 Progress  Met  -CP     STG 3  Passive R shoulder flexion to be >/= 140 degrees.  -CP     STG 3 Progress  Not Met  -CP     STG 4  Passive R shoulder abduction to be >/= 90 degrees.  -CP     STG 4 Progress  Met  -CP     STG 5  Supine R shoulder active flexion to be >/= 120 degrees.  -CP     STG 5 Progress  Met  -CP     STG 6  Active R shoulder ER in 45 degrees of abduction to be >/= 45 degrees.  -CP     STG 6 Progress  Not Met  -CP     STG 7  Active R shoulder IR in 45 degrees of abduction to be >/= 60 degrees.  -CP     STG 7 Progress  Not Met  -CP        Long Term Goals    LTG Date to Achieve  -- to be determined  -CP        Time Calculation    PT Goal Re-Cert Due Date  04/17/19  -CP       User Key  (r) = Recorded By, (t) = Taken By, (c) = Cosigned By    Initials Name Provider Type    CP Trudy Thornton PTA Physical Therapy Assistant          Therapy Education  Education Details: wall walks with fl stretch at end range  Given: HEP  Program: New  How Provided: Verbal, Demonstration  Provided to: Patient  Level of Understanding: Teach back education performed, Verbalized, Demonstrated              Time Calculation:   Start Time: 0925  Stop Time: 1035  Time Calculation (min): 70 min  Total Timed Code Minutes- PT: 43 minute(s)  Therapy Charges for Today     Code Description Service Date Service Provider Modifiers Qty    59545710859 HC PT THER SUPP EA 15 MIN 4/8/2019 Trudy Thornton, MARIKA GP 1    73539206774 HC PT THER PROC EA 15 MIN 4/8/2019 Trudy Thornton PTA GP 3                    Trudy Thornton PTA  4/8/2019

## 2019-04-10 ENCOUNTER — HOSPITAL ENCOUNTER (OUTPATIENT)
Dept: PHYSICAL THERAPY | Facility: HOSPITAL | Age: 49
Setting detail: THERAPIES SERIES
Discharge: HOME | End: 2019-04-10

## 2019-04-10 DIAGNOSIS — S42.254D CLOSED NONDISPLACED FRACTURE OF GREATER TUBEROSITY OF RIGHT HUMERUS WITH ROUTINE HEALING, SUBSEQUENT ENCOUNTER: ICD-10-CM

## 2019-04-10 DIAGNOSIS — G89.29 CHRONIC RIGHT SHOULDER PAIN: ICD-10-CM

## 2019-04-10 DIAGNOSIS — M75.41 SHOULDER IMPINGEMENT SYNDROME, RIGHT: ICD-10-CM

## 2019-04-10 DIAGNOSIS — M25.511 CHRONIC RIGHT SHOULDER PAIN: ICD-10-CM

## 2019-04-10 DIAGNOSIS — S46.001D INJURY OF RIGHT ROTATOR CUFF, SUBSEQUENT ENCOUNTER: Primary | ICD-10-CM

## 2019-04-10 DIAGNOSIS — M25.511 ACUTE PAIN OF RIGHT SHOULDER: ICD-10-CM

## 2019-04-10 PROCEDURE — 97110 THERAPEUTIC EXERCISES: CPT

## 2019-04-10 NOTE — THERAPY TREATMENT NOTE
Outpatient Physical Therapy Ortho Treatment Note  [facility]     Patient Name: Jeremy Siu  : 1970  MRN: [id number]  Today's Date: 4/10/2019      Visit Date: 04/10/2019     Subjective Improvement 98%  Visits   Visits approved 4 additional visits from 2019 to 2019  2 visits remaining  RTMD 2019  Recert Date 2019    Right Shoulder Pain    Visit Dx:    ICD-10-CM ICD-9-CM   1. Injury of right rotator cuff, subsequent encounter S46.001D V58.89     959.2   2. Acute pain of right shoulder M25.511 719.41   3. Chronic right shoulder pain M25.511 719.41    G89.29 338.29   4. Shoulder impingement syndrome, right M75.41 726.2   5. Closed nondisplaced fracture of greater tuberosity of right humerus with routine healing, subsequent encounter S42.254D V54.11       Patient Active Problem List   Diagnosis   • Acromioclavicular sprain   • Cigarette smoker   • Type 2 diabetes mellitus without complication, without long-term current use of insulin (CMS/HCC)   • Rotator cuff injury   • Chronic right shoulder pain   • Acute pain of right shoulder   • Nondisplaced fracture of greater tuberosity of right humerus with routine healing   • Shoulder impingement syndrome, right        Past Medical History:   Diagnosis Date   • Diabetes mellitus due to underlying condition with diabetic neuropathic arthropathy (CMS/HCC)    • Diabetic neuropathy (CMS/HCC)    • Male erectile disorder    • Myopia    • Tobacco dependence syndrome    • Type 2 diabetes mellitus without complications (CMS/HCC)         No past surgical history on file.    PT Ortho     Row Name 04/10/19 0900       Right Upper Ext    Rt Shoulder Abduction AROM  Standing 150; supine 150  -CP    Rt Shoulder Flexion AROM  standing 150; supine 156  -CP    Rt Shoulder External Rotation AROM  standing at side 60; supine at 45 of ab 65  -CP    Rt Shoulder Internal Rotation AROM  standing bilt line; supine at 45 of ab 60  -CP      User Key  (r) =  Recorded By, (t) = Taken By, (c) = Cosigned By    Initials Name Provider Type    Trudy Tijerina, MARIKA Physical Therapy Assistant                      PT Assessment/Plan     Row Name 04/10/19 1319          PT Assessment    Assessment Comments  Patient has met all goals but does have some limitation in ROM  -CP        PT Plan    PT Frequency  2x/week  -CP     Predicted Duration of Therapy Intervention (Therapy Eval)  3-4 weeks  -CP     PT Plan Comments  Cont with POC>  Cont with IR stretch.    -CP       User Key  (r) = Recorded By, (t) = Taken By, (c) = Cosigned By    Initials Name Provider Type    CP Trudy Thornton, MARIKA Physical Therapy Assistant          Modalities     Row Name 04/10/19 1300             Ice    Ice Applied  Yes  -CP      Location  right shoulder  -CP      Rx Minutes  15 mins  -CP      Ice S/P Rx  Yes  -CP        User Key  (r) = Recorded By, (t) = Taken By, (c) = Cosigned By    Initials Name Provider Type    Trudy Tijerina PTA Physical Therapy Assistant        Exercises     Row Name 04/10/19 1300             Subjective Comments    Subjective Comments  Patient states that he is doing well.  He does have some pain when he moves his arm at a certain angle  -CP         Subjective Pain    Able to rate subjective pain?  yes  -CP      Pre-Treatment Pain Level  1  -CP         Exercise 1    Exercise Name 1  Pro II level 4   -CP      Time 1  10  -CP      Additional Comments  FW/BW  -CP         Exercise 2    Exercise Name 2  Measurements and review HEP  -CP         Exercise 3    Exercise Name 3  Pulleys fl and ab  -CP      Reps 3  30  -CP         Exercise 4    Exercise Name 4  IR stretch with strap  -CP      Sets 4  3  -CP      Time 4  30  -CP         Exercise 5    Exercise Name 5  wall wipes with stretch at end range  -CP      Reps 5  30  -CP         Exercise 6    Exercise Name 6  supine AAROM shoulder fl  -CP      Sets 6  3  -CP      Reps 6  10  -CP         Exercise 7    Exercise Name 7  Supine  AAROM ER with cane  -CP      Reps 7  30  -CP         Exercise 8    Exercise Name 8  PROM  -CP      Time 8  12  -CP        User Key  (r) = Recorded By, (t) = Taken By, (c) = Cosigned By    Initials Name Provider Type    Trudy Tijerina PTA Physical Therapy Assistant                       PT OP Goals     Row Name 04/10/19 0900          PT Short Term Goals    STG Date to Achieve  04/17/19  -CP     STG 1  Note a >/= 25% subjective improvement.  -CP     STG 1 Progress  Met  -CP     STG 2  QuickDASH score to be </= 50.  -CP     STG 2 Progress  Met  -CP     STG 3  Passive R shoulder flexion to be >/= 140 degrees.  -CP     STG 3 Progress  Met  -CP     STG 4  Passive R shoulder abduction to be >/= 90 degrees.  -CP     STG 4 Progress  Met  -CP     STG 5  Supine R shoulder active flexion to be >/= 120 degrees.  -CP     STG 5 Progress  Met  -CP     STG 6  Active R shoulder ER in 45 degrees of abduction to be >/= 45 degrees.  -CP     STG 6 Progress  Met  -CP     STG 7  Active R shoulder IR in 45 degrees of abduction to be >/= 60 degrees.  -CP     STG 7 Progress  Met  -CP        Long Term Goals    LTG Date to Achieve  -- to be determined  -CP        Time Calculation    PT Goal Re-Cert Due Date  04/17/19  -CP       User Key  (r) = Recorded By, (t) = Taken By, (c) = Cosigned By    Initials Name Provider Type    Trudy Tijerina PTA Physical Therapy Assistant          Therapy Education  Education Details: IR stretch with strap or belt.  Supine AAROM ER with cane.  Given: HEP  Program: New  How Provided: Verbal, Demonstration  Provided to: Patient  Level of Understanding: Verbalized, Demonstrated              Time Calculation:   Start Time: 0930  Stop Time: 1030  Time Calculation (min): 60 min  Total Timed Code Minutes- PT: 45 minute(s)  Therapy Charges for Today     Code Description Service Date Service Provider Modifiers Qty    94507964512 HC PT THER SUPP EA 15 MIN 4/10/2019 Trudy Thornton PTA GP 1    [id number]   PT THER PROC EA 15 MIN 4/10/2019 Trudy Thornton, PTA GP 3                    Trudy Thornton, MARIKA  4/10/2019

## 2019-04-15 ENCOUNTER — HOSPITAL ENCOUNTER (OUTPATIENT)
Dept: PHYSICAL THERAPY | Facility: HOSPITAL | Age: 49
Setting detail: THERAPIES SERIES
Discharge: HOME | End: 2019-04-15

## 2019-04-15 DIAGNOSIS — S46.001D INJURY OF RIGHT ROTATOR CUFF, SUBSEQUENT ENCOUNTER: Primary | ICD-10-CM

## 2019-04-15 DIAGNOSIS — M75.41 SHOULDER IMPINGEMENT SYNDROME, RIGHT: ICD-10-CM

## 2019-04-15 DIAGNOSIS — S42.254D CLOSED NONDISPLACED FRACTURE OF GREATER TUBEROSITY OF RIGHT HUMERUS WITH ROUTINE HEALING, SUBSEQUENT ENCOUNTER: ICD-10-CM

## 2019-04-15 DIAGNOSIS — G89.29 CHRONIC RIGHT SHOULDER PAIN: ICD-10-CM

## 2019-04-15 DIAGNOSIS — M25.511 ACUTE PAIN OF RIGHT SHOULDER: ICD-10-CM

## 2019-04-15 DIAGNOSIS — M25.511 CHRONIC RIGHT SHOULDER PAIN: ICD-10-CM

## 2019-04-15 PROCEDURE — 97110 THERAPEUTIC EXERCISES: CPT

## 2019-04-15 NOTE — THERAPY TREATMENT NOTE
Outpatient Physical Therapy Ortho Treatment Note  [facility]     Patient Name: Jeremy Siu  : 1970  MRN: [id number]  Today's Date: 4/15/2019      Visit Date: 04/15/2019     Subjective Improvement 98%  Visits   Visits approved 4 addiitional visits from 2019 to 2019  1 visit remaining  RTMD 2019  Recert Date 2019    Right shoulder pain    Visit Dx:    ICD-10-CM ICD-9-CM   1. Injury of right rotator cuff, subsequent encounter S46.001D V58.89     959.2   2. Acute pain of right shoulder M25.511 719.41   3. Chronic right shoulder pain M25.511 719.41    G89.29 338.29   4. Shoulder impingement syndrome, right M75.41 726.2   5. Closed nondisplaced fracture of greater tuberosity of right humerus with routine healing, subsequent encounter S42.254D V54.11       Patient Active Problem List   Diagnosis   • Acromioclavicular sprain   • Cigarette smoker   • Type 2 diabetes mellitus without complication, without long-term current use of insulin (CMS/[facility])   • Rotator cuff injury   • Chronic right shoulder pain   • Acute pain of right shoulder   • Nondisplaced fracture of greater tuberosity of right humerus with routine healing   • Shoulder impingement syndrome, right        Past Medical History:   Diagnosis Date   • Diabetes mellitus due to underlying condition with diabetic neuropathic arthropathy (CMS/[facility])    • Diabetic neuropathy (CMS/[facility])    • Male erectile disorder    • Myopia    • Tobacco dependence syndrome    • Type 2 diabetes mellitus without complications (CMS/[facility])         No past surgical history on file.                    PT Assessment/Plan     Row Name 04/15/19 1109          PT Assessment    Assessment Comments  All goals have been met however patient does still have pain in right shoulder and reports some limited ues  -CP        PT Plan    PT Plan Comments  Cont with POC.  recheck next visit.  Patient has one approved visit.  If patient is to cont with therapy,  additional visits will need to be requested  -CP       User Key  (r) = Recorded By, (t) = Taken By, (c) = Cosigned By    Initials Name Provider Type    CP Trudy Thornton PTA Physical Therapy Assistant          Modalities     Row Name 04/15/19 0900             Ice    Ice Applied  Yes  -CP      Location  right shoulder  -CP      Rx Minutes  15 mins  -CP      Ice S/P Rx  Yes  -CP        User Key  (r) = Recorded By, (t) = Taken By, (c) = Cosigned By    Initials Name Provider Type    CP Trudy Thornton PTA Physical Therapy Assistant        Exercises     Row Name 04/15/19 0900             Subjective Comments    Subjective Comments  Reports having some increase pain in right shoulder today.  He had to move some furniture around.  -CP         Subjective Pain    Able to rate subjective pain?  yes  -CP      Pre-Treatment Pain Level  2  -CP      Post-Treatment Pain Level  2  -CP         Exercise 1    Exercise Name 1  Pro II level 3  -CP      Time 1  10  -CP      Additional Comments  FW/BW  -CP         Exercise 2    Exercise Name 2  Pulleys  -CP      Reps 2  30  -CP      Time 2  fl and AB  -CP         Exercise 3    Exercise Name 3  wall wipes with stretch at end range  -CP      Sets 3  2  -CP      Reps 3  10  -CP         Exercise 4    Exercise Name 4  wall circles CC/CCW  -CP      Reps 4  20 each  -CP         Exercise 5    Exercise Name 5  AB wall wipes with stretch at end  -CP      Sets 5  2  -CP      Reps 5  10  -CP         Exercise 6    Exercise Name 6  IR stretch with strap  -CP      Sets 6  3  -CP      Time 6  30  -CP         Exercise 7    Exercise Name 7  Standing fl to 90  -CP      Sets 7  2  -CP      Reps 7  10  -CP         Exercise 8    Exercise Name 8  standing scap to 90  -CP      Sets 8  2  -CP      Reps 8  10  -CP         Exercise 9    Exercise Name 9  seated wedge wipes with lift off  -CP      Sets 9  3  -CP      Reps 9  10  -CP         Exercise 10    Exercise Name 10  PROM  -CP      Time 10  5  -CP         User Key  (r) = Recorded By, (t) = Taken By, (c) = Cosigned By    Initials Name Provider Type    CP rTudy Thornton, MARIKA Physical Therapy Assistant                       PT OP Goals     Row Name 04/15/19 1100          PT Short Term Goals    STG Date to Achieve  04/17/19  -CP     STG 1  Note a >/= 25% subjective improvement.  -CP     STG 1 Progress  Met  -CP     STG 2  QuickDASH score to be </= 50.  -CP     STG 2 Progress  Met  -CP     STG 3  Passive R shoulder flexion to be >/= 140 degrees.  -CP     STG 3 Progress  Met  -CP     STG 4  Passive R shoulder abduction to be >/= 90 degrees.  -CP     STG 4 Progress  Met  -CP     STG 5  Supine R shoulder active flexion to be >/= 120 degrees.  -CP     STG 5 Progress  Met  -CP     STG 6  Active R shoulder ER in 45 degrees of abduction to be >/= 45 degrees.  -CP     STG 6 Progress  Met  -CP     STG 7  Active R shoulder IR in 45 degrees of abduction to be >/= 60 degrees.  -CP     STG 7 Progress  Met  -CP        Long Term Goals    LTG Date to Achieve  -- to be determined  -CP        Time Calculation    PT Goal Re-Cert Due Date  04/17/19  -CP       User Key  (r) = Recorded By, (t) = Taken By, (c) = Cosigned By    Initials Name Provider Type    CP Trudy Thornton PTA Physical Therapy Assistant                         Time Calculation:   Start Time: 0845  Stop Time: 0945  Time Calculation (min): 60 min  Total Timed Code Minutes- PT: 45 minute(s)  Therapy Charges for Today     Code Description Service Date Service Provider Modifiers Qty    08024789016 HC PT THER SUPP EA 15 MIN 4/15/2019 Trudy Thornton PTA GP 1    94110372217 HC PT THER PROC EA 15 MIN 4/15/2019 Trudy Thornton PTA GP 3                    Trudy Thornton PTA  4/15/2019

## 2019-04-17 ENCOUNTER — APPOINTMENT (OUTPATIENT)
Dept: PHYSICAL THERAPY | Facility: HOSPITAL | Age: 49
End: 2019-04-17

## 2019-04-18 ENCOUNTER — HOSPITAL ENCOUNTER (OUTPATIENT)
Dept: PHYSICAL THERAPY | Facility: HOSPITAL | Age: 49
Setting detail: THERAPIES SERIES
Discharge: HOME | End: 2019-04-18

## 2019-04-18 DIAGNOSIS — S42.254D CLOSED NONDISPLACED FRACTURE OF GREATER TUBEROSITY OF RIGHT HUMERUS WITH ROUTINE HEALING, SUBSEQUENT ENCOUNTER: ICD-10-CM

## 2019-04-18 DIAGNOSIS — G89.29 CHRONIC RIGHT SHOULDER PAIN: ICD-10-CM

## 2019-04-18 DIAGNOSIS — M25.511 CHRONIC RIGHT SHOULDER PAIN: ICD-10-CM

## 2019-04-18 DIAGNOSIS — M25.511 ACUTE PAIN OF RIGHT SHOULDER: ICD-10-CM

## 2019-04-18 DIAGNOSIS — S46.001D INJURY OF RIGHT ROTATOR CUFF, SUBSEQUENT ENCOUNTER: Primary | ICD-10-CM

## 2019-04-18 DIAGNOSIS — M75.41 SHOULDER IMPINGEMENT SYNDROME, RIGHT: ICD-10-CM

## 2019-04-18 PROCEDURE — 97110 THERAPEUTIC EXERCISES: CPT

## 2019-04-18 NOTE — THERAPY PROGRESS REPORT/RE-CERT
Outpatient Physical Therapy Ortho Progress Note  [facility]     Patient Name: Jeremy Siu  : 1970  MRN: [id number]  Today's Date: 2019      Visit Date: 2019  Subjective Improvement 98%  Visits -- requesting more visits  RTMD 2019  Recert Date 2019     Right shoulder pain        Patient Active Problem List   Diagnosis   • Acromioclavicular sprain   • Cigarette smoker   • Type 2 diabetes mellitus without complication, without long-term current use of insulin (CMS/[facility])   • Rotator cuff injury   • Chronic right shoulder pain   • Acute pain of right shoulder   • Nondisplaced fracture of greater tuberosity of right humerus with routine healing   • Shoulder impingement syndrome, right        Past Medical History:   Diagnosis Date   • Diabetes mellitus due to underlying condition with diabetic neuropathic arthropathy (CMS/HCC)    • Diabetic neuropathy (CMS/HCC)    • Male erectile disorder    • Myopia    • Tobacco dependence syndrome    • Type 2 diabetes mellitus without complications (CMS/[facility])         No past surgical history on file.    Visit Dx:     ICD-10-CM ICD-9-CM   1. Injury of right rotator cuff, subsequent encounter S46.001D V58.89     959.2   2. Acute pain of right shoulder M25.511 719.41   3. Chronic right shoulder pain M25.511 719.41    G89.29 338.29   4. Shoulder impingement syndrome, right M75.41 726.2   5. Closed nondisplaced fracture of greater tuberosity of right humerus with routine healing, subsequent encounter S42.254D V54.11             PT Ortho     Row Name 19 0800       Subjective Comments    Subjective Comments  The pt reports he has been unable to use his R shoulder for all ADLs w/o much problem. He states that he feels 98% better. He states that he feels at times that he cannot lift his shoulder up beyond a certain height when reaching overhead. He reports his exercises are going well at home. He consents to tx.   -MW       Subjective Pain     Able to rate subjective pain?  yes  -MW    Pre-Treatment Pain Level  1  -MW    Post-Treatment Pain Level  1  -MW       Right Upper Ext    Rt Shoulder Abduction AROM  standing 130 deg  -MW    Rt Shoulder Flexion AROM  standing 140 deg  -MW    Rt Shoulder External Rotation AROM  C7  -MW    Rt Shoulder Internal Rotation AROM  sacrum  -MW       MMT (Manual Muscle Testing)    General MMT Comments  R shoulder flex 5/5, R shoulder abd 4+/5, R shoulder IR/ER 4+/5 and R shoulder ER painful  -MW      User Key  (r) = Recorded By, (t) = Taken By, (c) = Cosigned By    Initials Name Provider Type    MW Amirah Hong, PT Physical Therapist                            PT OP Goals     Row Name 04/18/19 0800          PT Short Term Goals    STG Date to Achieve  04/17/19  -MW     STG 1  Note a >/= 25% subjective improvement.  -MW     STG 1 Progress  Met  -MW     STG 2  QuickDASH score to be </= 50.  -MW     STG 2 Progress  Met  -MW     STG 3  Passive R shoulder flexion to be >/= 140 degrees.  -MW     STG 3 Progress  Met  -MW     STG 4  Passive R shoulder abduction to be >/= 90 degrees.  -MW     STG 4 Progress  Met  -MW     STG 5  Supine R shoulder active flexion to be >/= 120 degrees.  -MW     STG 5 Progress  Met  -MW     STG 6  Active R shoulder ER in 45 degrees of abduction to be >/= 45 degrees.  -MW     STG 6 Progress  Met  -MW     STG 7  Active R shoulder IR in 45 degrees of abduction to be >/= 60 degrees.  -MW     STG 7 Progress  Met  -MW        Long Term Goals    LTG Date to Achieve  -- to be determined  -MW        Time Calculation    PT Goal Re-Cert Due Date  05/09/19  -MW       User Key  (r) = Recorded By, (t) = Taken By, (c) = Cosigned By    Initials Name Provider Type    MW Amirah Hong, PT Physical Therapist          PT Assessment/Plan     Row Name 04/18/19 0900          PT Assessment    Functional Limitations  Limitation in home management;Limitations in community activities;Limitations in functional capacity and  performance;Performance in leisure activities;Performance in self-care ADL;Performance in work activities  -MW     Impairments  Pain;Range of motion;Muscle strength  -MW     Assessment Comments  The pt has met all set therapy goals to date. He continues to have limited strength and ROM of the R shoulder. It is my impression based on conversation w/ pt that he is independent and compliant w/ his HEP. POC discussion had w/ pt this date and he feels that he is ready to attempt self-management of his R shoulder to conserve his remaining insurance coverage for the year. Discussed POC options w/ pt and pt electing to have a scheduled 2 week f/u w/ therapy to determine shoulder progress at that time after 2 weeks attempting self-management.  Time spent reviewing HEP and adding light shoulder strengthening to address pt's continued shoulder deficits. It is my impression the pt would benefit from potential continued therapy services depending on how self-management goes over the next 2 weeks.  -MW     Rehab Potential  Good  -MW     Patient/caregiver participated in establishment of treatment plan and goals  Yes  -MW     Patient would benefit from skilled therapy intervention  Yes  -MW        PT Plan    PT Frequency  1x/week  -MW     Predicted Duration of Therapy Intervention (Therapy Eval)  2 weeks w/ further TBD  -MW     PT Plan Comments  Assess pt's status. Cont POC pending pt's progress.  -MW       User Key  (r) = Recorded By, (t) = Taken By, (c) = Cosigned By    Initials Name Provider Type    Amirah Little, PT Physical Therapist          Modalities     Row Name 04/18/19 0800             Ice    Patient denies application of Ice  Yes  -MW        User Key  (r) = Recorded By, (t) = Taken By, (c) = Cosigned By    Initials Name Provider Type    Amirah Little, PT Physical Therapist        Exercises     Row Name 04/18/19 0800             Subjective Comments    Subjective Comments  The pt reports he has been unable to  "use his R shoulder for all ADLs w/o much problem. He states that he feels 98% better. He states that he feels at times that he cannot lift his shoulder up beyond a certain height when reaching overhead. He reports his exercises are going well at home. He consents to tx.   -MW         Subjective Pain    Able to rate subjective pain?  yes  -MW      Pre-Treatment Pain Level  1  -MW      Post-Treatment Pain Level  1  -MW         Exercise 1    Exercise Name 1  Pro II level 3.5  -MW      Time 1  10  -MW      Additional Comments  FW/BW  -MW         Exercise 2    Exercise Name 2  AROM shoulder  -MW      Time 2  5'  -MW         Exercise 3    Exercise Name 3  passive IR belt stretch  -MW      Sets 3  10  -MW      Time 3  10\"  -MW         Exercise 4    Exercise Name 4  TB ER walkout  -MW      Sets 4  1  -MW      Reps 4  10  -MW         Exercise 5    Exercise Name 5  TB ER no money  -MW      Sets 5  2  -MW      Reps 5  10  -MW         Exercise 6    Exercise Name 6  TB STARS  -MW      Sets 6  1  -MW      Reps 6  10  -MW         Exercise 7    Exercise Name 7  HEP update/review  -MW      Time 7  6'  -MW        User Key  (r) = Recorded By, (t) = Taken By, (c) = Cosigned By    Initials Name Provider Type    Amirah Little, PT Physical Therapist                        Quick DASH  Open a tight or new jar.: No Difficulty  Do heavy household chores (e.g., wash walls, wash floors): No Difficulty  Carry a shopping bag or briefcase: No Difficulty  Wash your back: Mild Difficulty  Use a knife to cut food: No Difficulty  Recreational activities in which you take some force or impact through your arm, should or hand (e.g. golf, hammering, tennis, etc.): Mild Difficulty  During the past week, to what extent has your arm, shoulder, or hand problem interfered with your normal social activites with family, friends, neighbors or groups?: Not at all  During the past week, were you limited in your work or other regular daily activities as a " result of your arm, shoulder or hand problem?: Not limited at all  Arm, Shoulder, or hand pain: Mild  Tingling (pins and needles) in your arm, shoulder, or hand: None  During the past week, how much difficulty have you had sleeping because of the pain in your arm, shoulder or hand?: Mild Difficulty  Number of Questions Answered: 11  Quick DASH Score: 9.09         Time Calculation:     Start Time: 0801  Stop Time: 0840  Time Calculation (min): 39 min  Total Timed Code Minutes- PT: 39 minute(s)     Therapy Charges for Today     Code Description Service Date Service Provider Modifiers Qty    91663751097 HC PT THER PROC EA 15 MIN 4/18/2019 Amirah Hong, PT GP 3                   Amirah Hong PT  4/18/2019

## 2019-05-02 ENCOUNTER — HOSPITAL ENCOUNTER (OUTPATIENT)
Dept: PHYSICAL THERAPY | Facility: HOSPITAL | Age: 49
Setting detail: THERAPIES SERIES
Discharge: HOME | End: 2019-05-02

## 2019-05-02 DIAGNOSIS — S46.001D INJURY OF RIGHT ROTATOR CUFF, SUBSEQUENT ENCOUNTER: Primary | ICD-10-CM

## 2019-05-02 DIAGNOSIS — M25.511 ACUTE PAIN OF RIGHT SHOULDER: ICD-10-CM

## 2019-05-02 PROCEDURE — 97110 THERAPEUTIC EXERCISES: CPT | Performed by: PHYSICAL THERAPIST

## 2019-05-03 NOTE — THERAPY DISCHARGE NOTE
Outpatient Physical Therapy Ortho Treatment Note/Discharge Summary  [facility]     Patient Name: Jeremy Siu  : 1970  MRN: [id number]  Today's Date: 2019      Visit Date: 2019  Attendance:   Subjective Improvement: 90+%  Next MD Appt: 19    Therapy Diagnosis: R shoulder humeral head impaction fracture/greater tuberosity fracture, DOI: 18        Visit Dx:    ICD-10-CM ICD-9-CM   1. Injury of right rotator cuff, subsequent encounter S46.001D V58.89     959.2   2. Acute pain of right shoulder M25.511 719.41       Patient Active Problem List   Diagnosis   • Acromioclavicular sprain   • Cigarette smoker   • Type 2 diabetes mellitus without complication, without long-term current use of insulin (CMS/HCC)   • Rotator cuff injury   • Chronic right shoulder pain   • Acute pain of right shoulder   • Nondisplaced fracture of greater tuberosity of right humerus with routine healing   • Shoulder impingement syndrome, right        Past Medical History:   Diagnosis Date   • Diabetes mellitus due to underlying condition with diabetic neuropathic arthropathy (CMS/HCC)    • Diabetic neuropathy (CMS/HCC)    • Male erectile disorder    • Myopia    • Tobacco dependence syndrome    • Type 2 diabetes mellitus without complications (CMS/HCC)         No past surgical history on file.    PT Ortho     Row Name 19 0900       Right Upper Ext    Rt Shoulder Abduction AROM  135  -SS    Rt Shoulder Extension AROM  57  -SS    Rt Shoulder Flexion AROM  140  -SS    Rt Shoulder External Rotation AROM  67 in standing 90/90; Functional ER to T1 L functional ER: T2  -SS    Rt Shoulder Internal Rotation AROM  45 in standing 90/90; Functional IR lateral border of hand to L4/5 level L functional IR: lateral border of hand to ~T10  -SS       MMT (Manual Muscle Testing)    General MMT Comments  R shoulder 5/5 in all planes  -SS    Shoulder Impingement/Rotator Cuff Special Tests    Empty Can Test (RC  "Lesion)  Negative  -SS    Speed's Test (LH of Biceps Lesion)  Negative  -SS      User Key  (r) = Recorded By, (t) = Taken By, (c) = Cosigned By    Initials Name Provider Type    Don Byrd, PT DPT Physical Therapist            PT Assessment/Plan     Row Name 05/02/19 0800          PT Assessment    Functional Limitations  Performance in self-care ADL;Performance in work activities  -     Impairments  Pain;Range of motion  -SS     Assessment Comments  Patient is doing well. He is at MMI at present. Minimal functional difficulties. Rehabilitation Progress Note will be faxed to MD prior to his appointment on 5/6/19.  -SS     Rehab Potential  Good  -SS     Patient/caregiver participated in establishment of treatment plan and goals  Yes  -SS     Patient would benefit from skilled therapy intervention  No  -SS        PT Plan    PT Plan Comments  D/C P.T. to HEP.  -       User Key  (r) = Recorded By, (t) = Taken By, (c) = Cosigned By    Initials Name Provider Type    Don Byrd, PT DPT Physical Therapist              Exercises     Row Name 05/02/19 0800             Subjective Comments    Subjective Comments  \"My shoulder feels fine.\" Only having pain if he sleeps on it. Has been helping wife paint without trouble. Lifting, pushing, pulling, playing with kids without trouble. \"Still having an issue reaching behind my back.\" Painful to reach back behind his back to don belt. 90+% subjective improvement.   -SS         Subjective Pain    Able to rate subjective pain?  yes  -SS      Pre-Treatment Pain Level  0  -SS      Post-Treatment Pain Level  0  -         Exercise 1    Exercise Name 1  Pro2, Seat 8, UE, F/R  -      Cueing 1  Verbal  -      Time 1  10 mins  -      Additional Comments  Level 4  -        User Key  (r) = Recorded By, (t) = Taken By, (c) = Cosigned By    Initials Name Provider Type    Don Byrd, PT DPT Physical Therapist            PT OP Goals     Row " Name 05/02/19 0800          PT Short Term Goals    STG Date to Achieve  04/17/19  -     STG 1  Note a >/= 25% subjective improvement.  -     STG 1 Progress  Met  -     STG 2  QuickDASH score to be </= 50.  -     STG 2 Progress  Met  -     STG 3  Passive R shoulder flexion to be >/= 140 degrees.  -SS     STG 3 Progress  Met  -     STG 4  Passive R shoulder abduction to be >/= 90 degrees.  -     STG 4 Progress  Met  -     STG 5  Supine R shoulder active flexion to be >/= 120 degrees.  -     STG 5 Progress  Met  -     STG 6  Active R shoulder ER in 45 degrees of abduction to be >/= 45 degrees.  -     STG 6 Progress  Met  -     STG 7  Active R shoulder IR in 45 degrees of abduction to be >/= 60 degrees.  -     STG 7 Progress  Met  -        Long Term Goals    LTG Date to Achieve  -- to be determined  -       User Key  (r) = Recorded By, (t) = Taken By, (c) = Cosigned By    Initials Name Provider Type     Don Castellano, PT DPT Physical Therapist          Therapy Education  Given: HEP  Program: Reinforced  How Provided: Verbal  Provided to: Patient  Level of Understanding: Verbalized              Time Calculation:   Start Time: 0854  Stop Time: 0917  Time Calculation (min): 23 min  Therapy Charges for Today     Code Description Service Date Service Provider Modifiers Qty    [id number] HC PT THER PROC EA 15 MIN 5/2/2019 Don Castellano, PT DPT GP 2                OP PT Discharge Summary  Date of Discharge: 05/02/19  Reason for Discharge: All goals achieved  Outcomes Achieved: Able to achieve all goals within established timeline  Discharge Destination: Home with home program      Don Castellano, SARABJIT, DPT, CHT  5/2/2019

## 2019-05-06 ENCOUNTER — OFFICE VISIT (OUTPATIENT)
Dept: ORTHOPEDIC SURGERY | Facility: CLINIC | Age: 49
End: 2019-05-06

## 2019-05-06 VITALS — BODY MASS INDEX: 37.64 KG/M2 | WEIGHT: 284 LBS | HEIGHT: 73 IN

## 2019-05-06 DIAGNOSIS — M25.511 CHRONIC RIGHT SHOULDER PAIN: ICD-10-CM

## 2019-05-06 DIAGNOSIS — M75.41 SHOULDER IMPINGEMENT SYNDROME, RIGHT: ICD-10-CM

## 2019-05-06 DIAGNOSIS — G89.29 CHRONIC RIGHT SHOULDER PAIN: ICD-10-CM

## 2019-05-06 DIAGNOSIS — S46.001D INJURY OF RIGHT ROTATOR CUFF, SUBSEQUENT ENCOUNTER: ICD-10-CM

## 2019-05-06 DIAGNOSIS — S42.254D CLOSED NONDISPLACED FRACTURE OF GREATER TUBEROSITY OF RIGHT HUMERUS WITH ROUTINE HEALING, SUBSEQUENT ENCOUNTER: Primary | ICD-10-CM

## 2019-05-06 PROCEDURE — 99213 OFFICE O/P EST LOW 20 MIN: CPT | Performed by: ORTHOPAEDIC SURGERY

## 2019-05-06 NOTE — PROGRESS NOTES
Jeremy Siu is a 48 y.o. male returns for     Chief Complaint   Patient presents with   • Right Shoulder - Follow-up       HISTORY OF PRESENT ILLNESS:  Right shoulder follow up. Pain scale 1/10. Last Monday was his last day of PT. 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       CONCURRENT MEDICAL HISTORY:    The following portions of the patient's history were reviewed and updated as appropriate: allergies, current medications, past family history, past medical history, past social history, past surgical history and problem list.     ROS  No fevers or chills.  No chest pain or shortness of air.  No GI or  disturbances.  No cardiac issues noted.  All other systems are reported negative or without change.    PHYSICAL EXAMINATION:       There were no vitals taken for this visit.    Physical Exam   Constitutional: He is oriented to person, place, and time. He appears well-developed.   HENT:   Head: Normocephalic and atraumatic.   Eyes: EOM are normal. Pupils are equal, round, and reactive to light.   Neck: Neck supple. No tracheal deviation present.   Pulmonary/Chest: Effort normal.   Musculoskeletal: He exhibits tenderness. He exhibits no edema or deformity.   Neurological: He is alert and oriented to person, place, and time.   Skin: Skin is warm and dry. No erythema.   Psychiatric: He has a normal mood and affect.       GAIT:     [x]  Normal  []  Antalgic    Assistive device: [x]  None  []  Walker     []  Crutches  []  Cane     []  Wheelchair  []  Stretcher    Ortho Exam  Improved external rotation, abduction as well as forward flexion.  Minimal pain resistance of the infraspinatus supraspinatus is mild pain.  Neurologically intact, he has trouble getting his hand behind his back on the right    No results found.    Three-view x-rays showed a pretty  mild sclerosis with no displacement of the greater tuberosity.      ASSESSMENT:    Diagnoses and all orders for this visit:    Closed nondisplaced fracture of greater tuberosity of right humerus with routine healing, subsequent encounter    Injury of right rotator cuff, subsequent encounter    Chronic right shoulder pain    Shoulder impingement syndrome, right          PLAN to go ahead and release him at this point placed on MMI.  He has no displacement I suspect this will continue to get better he will gradually work on his overactive activity of her mind about stretching techniques with regard to his getting his hand behind his back he will call with any problems we will see him back as needed.    No Follow-up on file.        This document has been electronically signed by Kenia Underwood on May 6, 2019 7:57 AM

## 2020-06-24 ENCOUNTER — OFFICE VISIT (OUTPATIENT)
Dept: ENDOCRINOLOGY | Facility: CLINIC | Age: 50
End: 2020-06-24

## 2020-06-24 VITALS
WEIGHT: 281.1 LBS | SYSTOLIC BLOOD PRESSURE: 148 MMHG | HEART RATE: 87 BPM | DIASTOLIC BLOOD PRESSURE: 90 MMHG | OXYGEN SATURATION: 96 % | BODY MASS INDEX: 37.25 KG/M2 | HEIGHT: 73 IN

## 2020-06-24 DIAGNOSIS — E78.2 MIXED HYPERLIPIDEMIA: ICD-10-CM

## 2020-06-24 DIAGNOSIS — Z79.4 TYPE 2 DIABETES MELLITUS WITH HYPERGLYCEMIA, WITH LONG-TERM CURRENT USE OF INSULIN (HCC): ICD-10-CM

## 2020-06-24 DIAGNOSIS — E11.43 DIABETIC AUTONOMIC NEUROPATHY ASSOCIATED WITH TYPE 2 DIABETES MELLITUS (HCC): Primary | ICD-10-CM

## 2020-06-24 DIAGNOSIS — E11.65 TYPE 2 DIABETES MELLITUS WITH HYPERGLYCEMIA, WITH LONG-TERM CURRENT USE OF INSULIN (HCC): ICD-10-CM

## 2020-06-24 PROCEDURE — 99204 OFFICE O/P NEW MOD 45 MIN: CPT | Performed by: NURSE PRACTITIONER

## 2020-06-24 PROCEDURE — 95250 CONT GLUC MNTR PHYS/QHP EQP: CPT | Performed by: NURSE PRACTITIONER

## 2020-06-24 RX ORDER — PREGABALIN 75 MG/1
75 CAPSULE ORAL 3 TIMES DAILY
Qty: 90 CAPSULE | Refills: 2 | Status: SHIPPED | OUTPATIENT
Start: 2020-06-24 | End: 2021-03-11 | Stop reason: DRUGHIGH

## 2020-06-24 NOTE — PATIENT INSTRUCTIONS
Basaglar 36 units takes  At night     Metformin 1000 mg po BID      Tradjenta 5 mg --stopped     Glipizide 10 mg --stopped       Victoza 1.2 mg once daily --- change Ozempic     Ozempic 0.5 mg once weekly     Side effects nausea    If vomiting or abdominal pain stop medication       Start Farxiga 5 mg       Novolog as a rescue     151- 200 2 units   201-250 4 units   251-300 6 units   Above 301 8 units         Goals for sugar    Fasting and before meals 80 to 130    2 hours after meals 180 or less      Aim for 60  grams of carbohydrate per meal    Aim for 15 grams of carbohydrate per snack

## 2020-06-24 NOTE — PROGRESS NOTES
Subjective    HPI     IN OFFICE VISIT       Referring provider Gina MCCOLLUM     49 year old male presents for consultation     REASON - diabetes       Duration/Timing -diagnosed 10 years ago      Quality - not controlled       Severity - high due to neuropathy and hyperglycemia       Severity (Complication/Hospitalizations)        Secondary Macrovascular - no CAD, no PAD, no CVA         Secondary Microvascular-- neuropathy, no diabetic retinopathy, no renal disease       Context--- complained of increased thirst and urination           Diabetes Regimen-- oral medications, insulin          Lab Results   Component Value Date    HGBA1C 9.0 (H) 01/16/2017       Last HgbA1c - 8.6 %         Blood Glucose Readings    Checking twice a week       Has seen up to 586     Mostly below 200         No lows recently         Diet-   Unhealthy diet     Drinks regular diet         Associated Signs/Symptoms       Hyperglycemic Symptoms: increased thirst        Hypoglycemic Episodes: none       Review of Systems  Review of Systems   Constitutional: Negative for activity change, appetite change, chills, fatigue, unexpected weight gain and unexpected weight loss.   HENT: Negative for congestion, dental problem, ear discharge, ear pain, swollen glands, tinnitus, trouble swallowing and voice change.    Eyes: Negative for blurred vision, photophobia, pain, discharge, redness, itching and visual disturbance.   Respiratory: Negative for apnea, cough, choking, chest tightness, shortness of breath and wheezing.    Cardiovascular: Negative for chest pain, palpitations and leg swelling.   Gastrointestinal: Negative for abdominal distention, abdominal pain, constipation, diarrhea, nausea and vomiting.   Endocrine: Negative for cold intolerance, heat intolerance, polydipsia and polyphagia.   Genitourinary: Negative for breast discharge, decreased libido, decreased urine volume and difficulty urinating.   Musculoskeletal: Negative for  arthralgias, back pain and neck pain.   Skin: Negative for color change, dry skin and skin lesions.   Allergic/Immunologic: Negative for environmental allergies, food allergies and immunocompromised state.   Neurological: Negative for dizziness, tremors, weakness, numbness, headache, memory problem and confusion.   Hematological: Negative for adenopathy.   Psychiatric/Behavioral: Negative for agitation, behavioral problems, decreased concentration and depressed mood. The patient is not nervous/anxious.      Wt Readings from Last 3 Encounters:   06/24/20 128 kg (281 lb 1.6 oz)   05/06/19 129 kg (284 lb)   04/04/19 130 kg (287 lb 6.4 oz)     Temp Readings from Last 3 Encounters:   12/16/18 98.2 °F (36.8 °C) (Tympanic)     BP Readings from Last 3 Encounters:   06/24/20 148/90   12/16/18 130/78   01/19/17 124/78     Pulse Readings from Last 3 Encounters:   06/24/20 87   12/16/18 70   12/07/16 79       Physical Exam  Physical Exam   Constitutional: He is oriented to person, place, and time. He appears well-developed and well-nourished. No distress.   HENT:   Head: Normocephalic and atraumatic.   Right Ear: External ear normal.   Left Ear: External ear normal.   Nose: Nose normal.   Eyes: Pupils are equal, round, and reactive to light. Conjunctivae and EOM are normal.   Neck: Normal range of motion. Neck supple. No tracheal deviation present. No thyromegaly present.   Cardiovascular: Normal rate, regular rhythm and normal heart sounds.   No murmur heard.  Pulmonary/Chest: Effort normal and breath sounds normal. No respiratory distress. He has no wheezes.   Abdominal: Soft. Bowel sounds are normal. There is no tenderness. There is no rebound and no guarding.   Musculoskeletal: Normal range of motion. He exhibits no edema, tenderness or deformity.   Neurological: He is alert and oriented to person, place, and time. No cranial nerve deficit.   Skin: Skin is warm and dry. No rash noted.   Psychiatric: He has a normal mood and  affect. His behavior is normal. Judgment and thought content normal.         Assessment/Plan    ICD-10-CM ICD-9-CM   1. Diabetic autonomic neuropathy associated with type 2 diabetes mellitus (CMS/HCC) E11.43 250.60     337.1   2. Type 2 diabetes mellitus with hyperglycemia, with long-term current use of insulin (CMS/HCC) E11.65 250.00    Z79.4 790.29     V58.67          Glycemic Management      Diabetes mellitus type 2      Lab Results   Component Value Date    HGBA1C 9.0 (H) 01/16/2017       He states the last A1c was 8.6%       Basaglar 36 units takes  At night     Metformin 1000 mg po BID      Tradjenta 5 mg --stop today     Glipizide 10 mg --stop today       Victoza 1.2 mg once daily -not effective -- change to  Ozempic     Ozempic 0.5 mg once weekly     Side effects nausea    If vomiting or abdominal pain stop medication       Start Farxiga 5 mg     Increase water intake by 2 glasses per day    Side effects discussed       Start Novolog i      Novolog as a rescue     151- 200 2 units   201-250 4 units   251-300 6 units   Above 301 8 units         Goals for sugar    Fasting and before meals 80 to 130    2 hours after meals 180 or less      Aim for 60  grams of carbohydrate per meal    Aim for 15 grams of carbohydrate per snack           Uncontrolled diabetes  Hyperglycemia    Insertion of continuous glucose monitor to define patter    The continuous glucose monitor that was inserted is a roman glucose monitor      Lipid Management    On lipitor 40 mg daily         No results found for: CHOL  Lab Results   Component Value Date    TRIG 265 (H) 03/25/2016     Lab Results   Component Value Date    HDL 32 (L) 03/25/2016     Lab Results   Component Value Date     03/25/2016     Blood Pressure Management      Lisinopril 5 mg daily       Microvascular Complication Monitoring      Last eye exam --- Nov. 2018 , no DR     No results found for: MICROALBUR, [id number]        Gabapentin -- 600 mg tid --- not effective        Try Lyrica 75 mg bid     [facility] controlled substance policy discussed.   Mark reviewed and appropriate          Bone Health      Lab Results   Component Value Date    CALCIUM 8.7 01/16/2017             Other Diabetes Related Aspects        No results found for: [id number]      Thyroid Health      No results found for: TSH      Return in about 2 weeks (around 7/8/2020).      Records from Mrs. Castillo received and reviewed from 2020  Thank you for this consultation           This document has been electronically signed by CHUN Kerr on June 24, 2020 16:05

## 2020-06-25 ENCOUNTER — TELEPHONE (OUTPATIENT)
Dept: FAMILY MEDICINE CLINIC | Facility: CLINIC | Age: 50
End: 2020-06-25

## 2020-06-25 NOTE — TELEPHONE ENCOUNTER
PA COMPLETED FOR NOVOLOG, OZEMPIC, AND FARXIGA ON 06/25/2020    ALL WERE DENIED INSURANCE SUGGEST BYDUREON, ADMELOG, AND STEGLATRO.

## 2020-07-08 ENCOUNTER — OFFICE VISIT (OUTPATIENT)
Dept: ENDOCRINOLOGY | Facility: CLINIC | Age: 50
End: 2020-07-08

## 2020-07-08 VITALS
HEIGHT: 73 IN | HEART RATE: 110 BPM | WEIGHT: 274.5 LBS | OXYGEN SATURATION: 98 % | DIASTOLIC BLOOD PRESSURE: 72 MMHG | SYSTOLIC BLOOD PRESSURE: 124 MMHG | BODY MASS INDEX: 36.38 KG/M2

## 2020-07-08 DIAGNOSIS — E11.65 TYPE 2 DIABETES MELLITUS WITH HYPERGLYCEMIA, WITH LONG-TERM CURRENT USE OF INSULIN (HCC): ICD-10-CM

## 2020-07-08 DIAGNOSIS — F17.210 CIGARETTE SMOKER: ICD-10-CM

## 2020-07-08 DIAGNOSIS — Z79.4 TYPE 2 DIABETES MELLITUS WITH HYPERGLYCEMIA, WITH LONG-TERM CURRENT USE OF INSULIN (HCC): ICD-10-CM

## 2020-07-08 DIAGNOSIS — E78.2 MIXED HYPERLIPIDEMIA: Primary | ICD-10-CM

## 2020-07-08 PROCEDURE — 99214 OFFICE O/P EST MOD 30 MIN: CPT | Performed by: NURSE PRACTITIONER

## 2020-07-08 PROCEDURE — 95251 CONT GLUC MNTR ANALYSIS I&R: CPT | Performed by: NURSE PRACTITIONER

## 2020-07-08 RX ORDER — INSULIN LISPRO 100 [IU]/ML
INJECTION, SOLUTION INTRAVENOUS; SUBCUTANEOUS
Qty: 3 PEN | Refills: 11 | Status: SHIPPED | OUTPATIENT
Start: 2020-07-08 | End: 2020-08-26 | Stop reason: SDUPTHER

## 2020-07-08 NOTE — PROGRESS NOTES
Subjective    Jeremy Siu is a 49 y.o. male. he is here today for follow-up.    History of Present Illness     IN OFFICE VISIT        Referring provider Gina MCCOLLUM      49 year old female presents for follow up      REASON - diabetes         Duration/Timing -diagnosed 10 years ago        Quality - not controlled         Severity - high due to neuropathy and hyperglycemia         Severity (Complication/Hospitalizations)        Secondary Macrovascular - no CAD, no PAD, no CVA         Secondary Microvascular-- neuropathy, no diabetic retinopathy, no renal disease         Context--- complained of increased thirst and urination             Diabetes Regimen-- oral medications, insulin                Lab Results   Component Value Date     HGBA1C 9.0 (H) 01/16/2017         Last HgbA1c - 8.6 %            Blood Glucose Readings     Checking twice a week         [employer] office use     Downloaded and reviewed    Dated from June 24 - July 3, 2020    Average     Target range 28 %     High 59%     Very high 13%     Low 0 %         Diet-   Unhealthy diet      Drinks regular diet            Associated Signs/Symptoms       Hyperglycemic Symptoms: increased thirst        Hypoglycemic Episodes: none             The following portions of the patient's history were reviewed and updated as appropriate:   Past Medical History:   Diagnosis Date   • Diabetes mellitus due to underlying condition with diabetic neuropathic arthropathy (CMS/HCC)    • Diabetic neuropathy (CMS/HCC)    • Male erectile disorder    • Myopia    • Tobacco dependence syndrome    • Type 2 diabetes mellitus without complications (CMS/HCC)      History reviewed. No pertinent surgical history.  Family History   Family history unknown: Yes       Current Outpatient Medications   Medication Sig Dispense Refill   • amitriptyline (ELAVIL) 10 MG tablet Take 10 mg by mouth Every Night.     • aspirin 81 MG EC tablet Take 81 mg by mouth Daily.     •  atorvastatin (LIPITOR) 40 MG tablet Take 40 mg by mouth Daily.     • Insulin Glargine (BASAGLAR KWIKPEN) 100 UNIT/ML injection pen Inject 36 Units under the skin into the appropriate area as directed Every Night.  2   • Insulin Pen Needle (GoodSense Pen Needle Penfine) 32G X 4 MM misc Inject 5 times daily 150 each 11   • Liraglutide (Victoza) 18 MG/3ML solution pen-injector injection Inject 1.2 mg under the skin into the appropriate area as directed Daily.     • lisinopril (PRINIVIL,ZESTRIL) 5 MG tablet Take 5 mg by mouth Daily.     • metFORMIN (GLUCOPHAGE) 1000 MG tablet Take 1 tablet by mouth 2 (Two) Times a Day With Meals. 60 tablet 5   • Omega-3 Fatty Acids (OMEGA 3 PO) Take  by mouth.     • omeprazole (priLOSEC) 20 MG capsule Take 20 mg by mouth Daily.     • pregabalin (Lyrica) 75 MG capsule Take 1 capsule by mouth 3 (Three) Times a Day. 90 capsule 2   • dapagliflozin (FARXIGA) 5 MG tablet tablet One tablet daily before breakfast every day 30 tablet 11   • Ertugliflozin L-PyroglutamicAc (STEGLATRO) 5 MG tablet Take 1 tablet by mouth Every Morning. 30 tablet 11   • exenatide er (BYDUREON) 2 MG/0.85ML auto-injector injection Inject 0.85 mL under the skin into the appropriate area as directed 1 (One) Time Per Week. 4 pen 11   • insulin aspart (NovoLOG FlexPen) 100 UNIT/ML solution pen-injector sc pen Inject 2 up to 8 units based on sliding scale TID 2 pen 11   • Insulin Lispro, 1 Unit Dial, (Admelog SoloStar) 100 UNIT/ML solution pen-injector 2 up to 10 units before each meal TID 3 pen 11   • meloxicam (MOBIC) 15 MG tablet Take 1 tablet by mouth Daily for 30 days. 30 tablet 0     No current facility-administered medications for this visit.      No Known Allergies  Social History     Socioeconomic History   • Marital status:      Spouse name: Not on file   • Number of children: Not on file   • Years of education: Not on file   • Highest education level: Not on file   Tobacco Use   • Smoking status: Current  "Every Day Smoker     Packs/day: 1.00     Types: Cigarettes   • Smokeless tobacco: Never Used   Substance and Sexual Activity   • Alcohol use: No   • Drug use: No   • Sexual activity: Yes       Review of Systems  Review of Systems   Constitutional: Negative for activity change, appetite change, diaphoresis and fatigue.   HENT: Negative for facial swelling, sneezing, sore throat, tinnitus, trouble swallowing and voice change.    Eyes: Negative for photophobia, pain, discharge, redness, itching and visual disturbance.   Respiratory: Negative for apnea, cough, choking, chest tightness and shortness of breath.    Cardiovascular: Negative for chest pain, palpitations and leg swelling.   Gastrointestinal: Negative for abdominal distention, abdominal pain, constipation, diarrhea, nausea and vomiting.   Endocrine: Negative for cold intolerance, heat intolerance, polydipsia, polyphagia and polyuria.   Genitourinary: Negative for difficulty urinating, dysuria, frequency, hematuria and urgency.   Musculoskeletal: Negative for arthralgias, back pain, gait problem, joint swelling, myalgias, neck pain and neck stiffness.   Skin: Negative for color change, pallor, rash and wound.   Neurological: Negative for dizziness, tremors, weakness, light-headedness, numbness and headaches.   Hematological: Negative for adenopathy. Does not bruise/bleed easily.   Psychiatric/Behavioral: Negative for behavioral problems, confusion and sleep disturbance.        Objective    /72   Pulse 110   Ht 185.4 cm (73\")   Wt 125 kg (274 lb 8 oz)   SpO2 98%   BMI 36.22 kg/m²   Physical Exam   Constitutional: He is oriented to person, place, and time. He appears well-developed and well-nourished. No distress.   HENT:   Head: Normocephalic and atraumatic.   Right Ear: External ear normal.   Left Ear: External ear normal.   Nose: Nose normal.   Eyes: Pupils are equal, round, and reactive to light. Conjunctivae and EOM are normal.   Neck: Normal " range of motion. Neck supple. No tracheal deviation present. No thyromegaly present.   Cardiovascular: Normal rate, regular rhythm and normal heart sounds.   No murmur heard.  Pulmonary/Chest: Effort normal and breath sounds normal. No respiratory distress. He has no wheezes.   Abdominal: Soft. Bowel sounds are normal. There is no tenderness. There is no rebound and no guarding.   Musculoskeletal: Normal range of motion. He exhibits no edema, tenderness or deformity.   Neurological: He is alert and oriented to person, place, and time. No cranial nerve deficit.   Skin: Skin is warm and dry. No rash noted.   Psychiatric: He has a normal mood and affect. His behavior is normal. Judgment and thought content normal.       Lab Review  Glucose (mg/dl)   Date Value   01/16/2017 281 (H)     Sodium (mmol/L)   Date Value   01/16/2017 137     Potassium (mmol/L)   Date Value   01/16/2017 4.4     Chloride (mmol/L)   Date Value   01/16/2017 95     CO2 (mmol/L)   Date Value   01/16/2017 33 (H)     BUN (mg/dl)   Date Value   01/16/2017 15     Creatinine (mg/dl)   Date Value   01/16/2017 0.8     Hemoglobin A1C (%TotHgb)   Date Value   01/16/2017 9.0 (H)   03/25/2016 7.0 (H)     Triglycerides (mg/dl)   Date Value   03/25/2016 265 (H)     LDL Cholesterol  (mg/dl)   Date Value   03/25/2016 111       Assessment/Plan      1. Mixed hyperlipidemia    2. Type 2 diabetes mellitus with hyperglycemia, with long-term current use of insulin (CMS/[facility])    3. Cigarette smoker    .    Medications prescribed:  Outpatient Encounter Medications as of 7/8/2020   Medication Sig Dispense Refill   • amitriptyline (ELAVIL) 10 MG tablet Take 10 mg by mouth Every Night.     • aspirin 81 MG EC tablet Take 81 mg by mouth Daily.     • atorvastatin (LIPITOR) 40 MG tablet Take 40 mg by mouth Daily.     • Insulin Glargine (BASAGLAR KWIKPEN) 100 UNIT/ML injection pen Inject 36 Units under the skin into the appropriate area as directed Every Night.  2   • Insulin Pen  Needle (GoodSense Pen Needle Penfine) 32G X 4 MM misc Inject 5 times daily 150 each 11   • Liraglutide (Victoza) 18 MG/3ML solution pen-injector injection Inject 1.2 mg under the skin into the appropriate area as directed Daily.     • lisinopril (PRINIVIL,ZESTRIL) 5 MG tablet Take 5 mg by mouth Daily.     • metFORMIN (GLUCOPHAGE) 1000 MG tablet Take 1 tablet by mouth 2 (Two) Times a Day With Meals. 60 tablet 5   • Omega-3 Fatty Acids (OMEGA 3 PO) Take  by mouth.     • omeprazole (priLOSEC) 20 MG capsule Take 20 mg by mouth Daily.     • pregabalin (Lyrica) 75 MG capsule Take 1 capsule by mouth 3 (Three) Times a Day. 90 capsule 2   • dapagliflozin (FARXIGA) 5 MG tablet tablet One tablet daily before breakfast every day 30 tablet 11   • Ertugliflozin L-PyroglutamicAc (STEGLATRO) 5 MG tablet Take 1 tablet by mouth Every Morning. 30 tablet 11   • exenatide er (BYDUREON) 2 MG/0.85ML auto-injector injection Inject 0.85 mL under the skin into the appropriate area as directed 1 (One) Time Per Week. 4 pen 11   • insulin aspart (NovoLOG FlexPen) 100 UNIT/ML solution pen-injector sc pen Inject 2 up to 8 units based on sliding scale TID 2 pen 11   • Insulin Lispro, 1 Unit Dial, (Admelog SoloStar) 100 UNIT/ML solution pen-injector 2 up to 10 units before each meal TID 3 pen 11   • meloxicam (MOBIC) 15 MG tablet Take 1 tablet by mouth Daily for 30 days. 30 tablet 0   • [DISCONTINUED] glipiZIDE (GLUCOTROL) 10 MG tablet Take 20 mg by mouth Daily.     • [DISCONTINUED] linagliptin (TRADJENTA) 5 MG tablet tablet Take 1 tablet by mouth Daily. 30 tablet 5   • [DISCONTINUED] Semaglutide,0.25 or 0.5MG/DOS, (Ozempic, 0.25 or 0.5 MG/DOSE,) 2 MG/1.5ML solution pen-injector Inject 0.5 mg under the skin into the appropriate area as directed 1 (One) Time Per Week. 2 pen 11     No facility-administered encounter medications on file as of 7/8/2020.        Orders placed during this encounter include:  No orders of the defined types were placed in  this encounter.    Glycemic Management        Diabetes mellitus type 2              Lab Results   Component Value Date     HGBA1C 9.0 (H) 01/16/2017         He states the last A1c was 8.6%       [employer] office use     Downloaded and reviewed    Dated from June 24 - July 3, 2020    Average     Target range 28 %     High 59%     Very high 13%     Low 0 %          Basaglar 36 units takes  At night      Metformin 1000 mg po BID       Tradjenta 5 mg --stop today      Glipizide 10 mg --stop today         Victoza 1.2 mg once daily -not effective --     Ozempic 0.5 mg once weekly --- not covered     Changed to Bydureon 2 mg once weekly      Side effects nausea     If vomiting or abdominal pain stop medication         Farxiga 5 mg ---NOT COVERED    Change to steglatro 5 mg one daily      Increase water intake by 2 glasses per day     Side effects discussed         Start Novolog -- Ademlog             Take 4 units with each meal plus sliding scale      151- 200          2 units   201-250           4 units   251-300           6 units   Above 301       8 units            Goals for sugar     Fasting and before meals 80 to 130     2 hours after meals 180 or less        Aim for 60  grams of carbohydrate per meal     Aim for 15 grams of carbohydrate per snack                       Lipid Management     On lipitor 40 mg daily            No results found for: CHOL        Lab Results   Component Value Date     TRIG 265 (H) 03/25/2016      Lab Results   Component Value Date     HDL 32 (L) 03/25/2016            Lab Results   Component Value Date      03/25/2016      Blood Pressure Management        Lisinopril 5 mg daily         Microvascular Complication Monitoring        Last eye exam --- Nov. 2018 , no DR      No results found for: MICROALBUR, [id number]           Gabapentin -- 600 mg tid --- not effective      Try Lyrica 75 mg bid      [facility] controlled substance policy discussed.   Mark reviewed and appropriate               Bone Health              Lab Results   Component Value Date     CALCIUM 8.7 01/16/2017                  Other Diabetes Related Aspects           No results found for: [id number]        Thyroid Health        No results found for: TSH    Preventive care    Smoker    Jeremy Siu  reports that he has been smoking cigarettes. He has been smoking about 1.00 pack per day. He has never used smokeless tobacco.. I have educated him on the risk of diseases from using tobacco products such as cancer, COPD and heart diease.     I advised him to quit and he is not willing to quit.    I spent 3  minutes counseling the patient.                4. Follow-up: Return in about 7 weeks (around 8/26/2020) for Recheck.

## 2020-07-23 ENCOUNTER — TELEPHONE (OUTPATIENT)
Dept: ENDOCRINOLOGY | Facility: CLINIC | Age: 50
End: 2020-07-23

## 2020-07-23 NOTE — TELEPHONE ENCOUNTER
Explained to pt that Basaglar isn't cutting it. He will start Admelog prior to each meal. Start with 4 un for 60 g of CHO plus SS 2:50 above 150. Re-explained to pt. Pt verbalized understanding.

## 2020-07-23 NOTE — TELEPHONE ENCOUNTER
Sugar will not go below 300 and 1 shot she said to use as a rescue shot but the instructions say 3 X Daily.  [phone]     Can someone call and go insulin   Thank You

## 2020-08-19 ENCOUNTER — TELEPHONE (OUTPATIENT)
Dept: ENDOCRINOLOGY | Facility: CLINIC | Age: 50
End: 2020-08-19

## 2020-08-19 NOTE — TELEPHONE ENCOUNTER
Pt called stating that his sugars are not getting below 300, and the medications are not helping him. He is requesting a call back at  [phone].

## 2020-08-20 NOTE — TELEPHONE ENCOUNTER
Fasting sugars running about 320. Currently, giving Basaglar 36units nightly, Bydueron, and Admelog 10 units with meals three times a day. Instructed patient to increase basaglar to 42 units nightly. Keep appt next week.

## 2020-08-26 ENCOUNTER — OFFICE VISIT (OUTPATIENT)
Dept: ENDOCRINOLOGY | Facility: CLINIC | Age: 50
End: 2020-08-26

## 2020-08-26 VITALS
HEART RATE: 113 BPM | SYSTOLIC BLOOD PRESSURE: 130 MMHG | OXYGEN SATURATION: 98 % | HEIGHT: 73 IN | DIASTOLIC BLOOD PRESSURE: 74 MMHG | BODY MASS INDEX: 35.66 KG/M2 | WEIGHT: 269.1 LBS

## 2020-08-26 DIAGNOSIS — Z79.4 TYPE 2 DIABETES MELLITUS WITH HYPERGLYCEMIA, WITH LONG-TERM CURRENT USE OF INSULIN (HCC): ICD-10-CM

## 2020-08-26 DIAGNOSIS — E11.65 TYPE 2 DIABETES MELLITUS WITH HYPERGLYCEMIA, WITH LONG-TERM CURRENT USE OF INSULIN (HCC): ICD-10-CM

## 2020-08-26 DIAGNOSIS — E11.43 DIABETIC AUTONOMIC NEUROPATHY ASSOCIATED WITH TYPE 2 DIABETES MELLITUS (HCC): Primary | ICD-10-CM

## 2020-08-26 DIAGNOSIS — E78.2 MIXED HYPERLIPIDEMIA: ICD-10-CM

## 2020-08-26 PROCEDURE — 99214 OFFICE O/P EST MOD 30 MIN: CPT | Performed by: NURSE PRACTITIONER

## 2020-08-26 RX ORDER — INSULIN GLARGINE 100 [IU]/ML
60 INJECTION, SOLUTION SUBCUTANEOUS NIGHTLY
Qty: 6 PEN | Refills: 11 | Status: SHIPPED | OUTPATIENT
Start: 2020-08-26 | End: 2021-04-23 | Stop reason: SDUPTHER

## 2020-08-26 RX ORDER — INSULIN LISPRO 100 [IU]/ML
INJECTION, SOLUTION INTRAVENOUS; SUBCUTANEOUS
Qty: 8 PEN | Refills: 11 | Status: SHIPPED | OUTPATIENT
Start: 2020-08-26 | End: 2022-02-01

## 2020-08-26 RX ORDER — PREGABALIN 100 MG/1
200 CAPSULE ORAL 3 TIMES DAILY
Qty: 180 CAPSULE | Refills: 5 | Status: SHIPPED | OUTPATIENT
Start: 2020-08-26 | End: 2021-07-23

## 2020-08-26 NOTE — PROGRESS NOTES
Subjective    Jeremy Siu is a 49 y.o. male. he is here today for follow-up.    History of Present Illness     In office visit        Referring provider CHUN Lewis      49 year old female presents for follow up      REASON - diabetes         Duration/Timing -diagnosed 10 years ago        Quality - not controlled         Severity - high due to neuropathy and hyperglycemia         Severity (Complication/Hospitalizations)        Secondary Macrovascular - no CAD, no PAD, no CVA         Secondary Microvascular-- neuropathy, no diabetic retinopathy, no renal disease         Context--- complained of increased thirst and urination             Diabetes Regimen-- oral medications, insulin                Lab Results   Component Value Date     HGBA1C 9.0 (H) 01/16/2017         Last HgbA1c - 8.6 %            Blood Glucose Readings     Checking twice a week          Running 200 and up         Diet-   Unhealthy diet      Drinks regular diet            Associated Signs/Symptoms       Hyperglycemic Symptoms: increased thirst        Hypoglycemic Episodes: none             The following portions of the patient's history were reviewed and updated as appropriate:   Past Medical History:   Diagnosis Date   • Diabetes mellitus due to underlying condition with diabetic neuropathic arthropathy (CMS/HCC)    • Diabetic neuropathy (CMS/HCC)    • Male erectile disorder    • Myopia    • Tobacco dependence syndrome    • Type 2 diabetes mellitus without complications (CMS/HCC)      No past surgical history on file.  Family History   Family history unknown: Yes       Current Outpatient Medications   Medication Sig Dispense Refill   • amitriptyline (ELAVIL) 10 MG tablet Take 10 mg by mouth Every Night.     • aspirin 81 MG EC tablet Take 81 mg by mouth Daily.     • atorvastatin (LIPITOR) 40 MG tablet Take 40 mg by mouth Daily.     • Ertugliflozin L-PyroglutamicAc (STEGLATRO) 5 MG tablet Take 1 tablet by mouth Every Morning. 30  tablet 11   • exenatide er (BYDUREON) 2 MG/0.85ML auto-injector injection Inject 0.85 mL under the skin into the appropriate area as directed 1 (One) Time Per Week. 4 pen 11   • Insulin Glargine (BASAGLAR KWIKPEN) 100 UNIT/ML injection pen Inject 60 Units under the skin into the appropriate area as directed Every Night. 6 pen 11   • Insulin Lispro, 1 Unit Dial, (Admelog SoloStar) 100 UNIT/ML solution pen-injector 18 units up to 30 units before each meal TID 8 pen 11   • Insulin Pen Needle (GoodSense Pen Needle Penfine) 32G X 4 MM misc Inject 5 times daily 150 each 11   • lisinopril (PRINIVIL,ZESTRIL) 5 MG tablet Take 5 mg by mouth Daily.     • metFORMIN (GLUCOPHAGE) 1000 MG tablet Take 1 tablet by mouth 2 (Two) Times a Day With Meals. 60 tablet 5   • Omega-3 Fatty Acids (OMEGA 3 PO) Take  by mouth.     • omeprazole (priLOSEC) 20 MG capsule Take 20 mg by mouth Daily.     • pregabalin (Lyrica) 75 MG capsule Take 1 capsule by mouth 3 (Three) Times a Day. 90 capsule 2   • insulin aspart (NovoLOG FlexPen) 100 UNIT/ML solution pen-injector sc pen Inject 2 up to 8 units based on sliding scale TID 2 pen 11   • meloxicam (MOBIC) 15 MG tablet Take 1 tablet by mouth Daily for 30 days. 30 tablet 0   • pregabalin (Lyrica) 100 MG capsule Take 2 capsules by mouth 3 (Three) Times a Day. 180 capsule 5     No current facility-administered medications for this visit.      No Known Allergies  Social History     Socioeconomic History   • Marital status:      Spouse name: Not on file   • Number of children: Not on file   • Years of education: Not on file   • Highest education level: Not on file   Tobacco Use   • Smoking status: Current Every Day Smoker     Packs/day: 1.00     Types: Cigarettes   • Smokeless tobacco: Never Used   Substance and Sexual Activity   • Alcohol use: No   • Drug use: No   • Sexual activity: Yes       Review of Systems  Review of Systems   Constitutional: Negative for activity change, appetite change,  "diaphoresis and fatigue.   HENT: Negative for facial swelling, sneezing, sore throat, tinnitus, trouble swallowing and voice change.    Eyes: Negative for photophobia, pain, discharge, redness, itching and visual disturbance.   Respiratory: Negative for apnea, cough, choking, chest tightness and shortness of breath.    Cardiovascular: Negative for chest pain, palpitations and leg swelling.   Gastrointestinal: Negative for abdominal distention, abdominal pain, constipation, diarrhea, nausea and vomiting.   Endocrine: Negative for cold intolerance, heat intolerance, polydipsia, polyphagia and polyuria.   Genitourinary: Negative for difficulty urinating, dysuria, frequency, hematuria and urgency.   Musculoskeletal: Negative for arthralgias, back pain, gait problem, joint swelling, myalgias, neck pain and neck stiffness.   Skin: Negative for color change, pallor, rash and wound.   Neurological: Negative for dizziness, tremors, weakness, light-headedness, numbness and headaches.   Hematological: Negative for adenopathy. Does not bruise/bleed easily.   Psychiatric/Behavioral: Negative for behavioral problems, confusion and sleep disturbance.        Objective    /74   Pulse 113   Ht 185.4 cm (73\")   Wt 122 kg (269 lb 1.6 oz)   SpO2 98%   BMI 35.50 kg/m²   Physical Exam   Constitutional: He is oriented to person, place, and time. He appears well-developed and well-nourished. No distress.   HENT:   Head: Normocephalic and atraumatic.   Right Ear: External ear normal.   Left Ear: External ear normal.   Nose: Nose normal.   Eyes: Pupils are equal, round, and reactive to light. Conjunctivae and EOM are normal.   Neck: Normal range of motion. Neck supple. No tracheal deviation present. No thyromegaly present.   Cardiovascular: Normal rate, regular rhythm and normal heart sounds.   No murmur heard.  Pulmonary/Chest: Effort normal and breath sounds normal. No respiratory distress. He has no wheezes.   Abdominal: Soft. " Bowel sounds are normal. There is no tenderness. There is no rebound and no guarding.   Musculoskeletal: Normal range of motion. He exhibits no edema, tenderness or deformity.    Jeremy had a diabetic foot exam performed today.   During the foot exam he had a monofilament test performed.    Neurological Sensory Findings -  Altered sharp/dull right ankle/foot discrimination and altered sharp/dull left ankle/foot discrimination. Right ankle/foot altered proprioception and left ankle/foot altered proprioception.  Vascular Status -  His right foot exhibits no edema. His left foot exhibits no edema.  Skin Integrity  -  He has callous right foot and right heel is dry and cracked.He has callous left foot and left heel dry and cracked..  Neurological: He is alert and oriented to person, place, and time. No cranial nerve deficit.   Skin: Skin is warm and dry. No rash noted.   Psychiatric: He has a normal mood and affect. His behavior is normal. Judgment and thought content normal.       Lab Review  Glucose (mg/dl)   Date Value   01/16/2017 281 (H)     Sodium (mmol/L)   Date Value   01/16/2017 137     Potassium (mmol/L)   Date Value   01/16/2017 4.4     Chloride (mmol/L)   Date Value   01/16/2017 95     CO2 (mmol/L)   Date Value   01/16/2017 33 (H)     BUN (mg/dl)   Date Value   01/16/2017 15     Creatinine (mg/dl)   Date Value   01/16/2017 0.8     Hemoglobin A1C (%TotHgb)   Date Value   01/16/2017 9.0 (H)   03/25/2016 7.0 (H)     Triglycerides (mg/dl)   Date Value   03/25/2016 265 (H)     LDL Cholesterol  (mg/dl)   Date Value   03/25/2016 111       Assessment/Plan      1. Diabetic autonomic neuropathy associated with type 2 diabetes mellitus (CMS/[facility])    2. Type 2 diabetes mellitus with hyperglycemia, with long-term current use of insulin (CMS/[facility])    3. Mixed hyperlipidemia    .    Medications prescribed:  Outpatient Encounter Medications as of 8/26/2020   Medication Sig Dispense Refill   • amitriptyline (ELAVIL) 10 MG  tablet Take 10 mg by mouth Every Night.     • aspirin 81 MG EC tablet Take 81 mg by mouth Daily.     • atorvastatin (LIPITOR) 40 MG tablet Take 40 mg by mouth Daily.     • Ertugliflozin L-PyroglutamicAc (STEGLATRO) 5 MG tablet Take 1 tablet by mouth Every Morning. 30 tablet 11   • exenatide er (BYDUREON) 2 MG/0.85ML auto-injector injection Inject 0.85 mL under the skin into the appropriate area as directed 1 (One) Time Per Week. 4 pen 11   • Insulin Glargine (BASAGLAR KWIKPEN) 100 UNIT/ML injection pen Inject 60 Units under the skin into the appropriate area as directed Every Night. 6 pen 11   • Insulin Lispro, 1 Unit Dial, (Admelog SoloStar) 100 UNIT/ML solution pen-injector 18 units up to 30 units before each meal TID 8 pen 11   • Insulin Pen Needle (GoodSense Pen Needle Penfine) 32G X 4 MM misc Inject 5 times daily 150 each 11   • lisinopril (PRINIVIL,ZESTRIL) 5 MG tablet Take 5 mg by mouth Daily.     • metFORMIN (GLUCOPHAGE) 1000 MG tablet Take 1 tablet by mouth 2 (Two) Times a Day With Meals. 60 tablet 5   • Omega-3 Fatty Acids (OMEGA 3 PO) Take  by mouth.     • omeprazole (priLOSEC) 20 MG capsule Take 20 mg by mouth Daily.     • pregabalin (Lyrica) 75 MG capsule Take 1 capsule by mouth 3 (Three) Times a Day. 90 capsule 2   • [DISCONTINUED] Insulin Glargine (BASAGLAR KWIKPEN) 100 UNIT/ML injection pen Inject 36 Units under the skin into the appropriate area as directed Every Night.  2   • [DISCONTINUED] Insulin Lispro, 1 Unit Dial, (Admelog SoloStar) 100 UNIT/ML solution pen-injector 2 up to 10 units before each meal TID 3 pen 11   • insulin aspart (NovoLOG FlexPen) 100 UNIT/ML solution pen-injector sc pen Inject 2 up to 8 units based on sliding scale TID 2 pen 11   • meloxicam (MOBIC) 15 MG tablet Take 1 tablet by mouth Daily for 30 days. 30 tablet 0   • pregabalin (Lyrica) 100 MG capsule Take 2 capsules by mouth 3 (Three) Times a Day. 180 capsule 5   • [DISCONTINUED] dapagliflozin (FARXIGA) 5 MG tablet tablet  One tablet daily before breakfast every day 30 tablet 11   • [DISCONTINUED] Liraglutide (Victoza) 18 MG/3ML solution pen-injector injection Inject 1.2 mg under the skin into the appropriate area as directed Daily.       No facility-administered encounter medications on file as of 8/26/2020.        Orders placed during this encounter include:  No orders of the defined types were placed in this encounter.    Glycemic Management        Diabetes mellitus type 2          He states the last A1c was 8.6%                    Basaglar taking 42 units ---increase to 50 units      Metformin 1000 mg po BID       Tradjenta 5 mg --stop today      Glipizide 10 mg --stop today         Victoza 1.2 mg once daily -not effective --     Ozempic 0.5 mg once weekly --- not covered       Bydureon 2 mg once weekly --taking      Side effects nausea     If vomiting or abdominal pain stop medication         Farxiga 5 mg ---NOT COVERED      steglatro 5 mg one daily      Increase water intake by 2 glasses per day     Side effects discussed         Admelog        Taking 10 units  ----increase to 14 units for small and increase to 18 units for large meal      151- 200          2 units   201-250           4 units   251-300           6 units   Above 301       8 units            Goals for sugar     Fasting and before meals 80 to 130     2 hours after meals 180 or less        Aim for 60  grams of carbohydrate per meal     Aim for 15 grams of carbohydrate per snack                        Lipid Management     On lipitor 40 mg daily                     Lab Results   Component Value Date     TRIG 265 (H) 03/25/2016            Lab Results   Component Value Date     HDL 32 (L) 03/25/2016                Lab Results   Component Value Date      03/25/2016      Blood Pressure Management        Lisinopril 5 mg daily         Microvascular Complication Monitoring        Last eye exam --- Nov. 2018 , no DR     Wait on eye exam due to hyperglycemia      No  results found for: MICROALBUR, [id number]           Gabapentin -- 600 mg tid --- not effective      Try Lyrica 75 mg bid ---increase to 200 mg TID      [facility] controlled substance policy discussed.   Mark reviewed and appropriate              Bone Health                  Lab Results   Component Value Date     CALCIUM 8.7 01/16/2017                  Other Diabetes Related Aspects           No results found for: [id number]        Thyroid Health        No results found for: TSH       Preventive care     Smoker                      4. Follow-up: Return in about 2 months (around 10/26/2020) for Recheck.

## 2020-09-30 ENCOUNTER — TELEPHONE (OUTPATIENT)
Dept: FAMILY MEDICINE CLINIC | Facility: CLINIC | Age: 50
End: 2020-09-30

## 2021-03-11 ENCOUNTER — OFFICE VISIT (OUTPATIENT)
Dept: ENDOCRINOLOGY | Facility: CLINIC | Age: 51
End: 2021-03-11

## 2021-03-11 VITALS
HEART RATE: 115 BPM | BODY MASS INDEX: 33.52 KG/M2 | SYSTOLIC BLOOD PRESSURE: 116 MMHG | WEIGHT: 252.9 LBS | DIASTOLIC BLOOD PRESSURE: 72 MMHG | HEIGHT: 73 IN | OXYGEN SATURATION: 97 %

## 2021-03-11 DIAGNOSIS — E11.43 DIABETIC AUTONOMIC NEUROPATHY ASSOCIATED WITH TYPE 2 DIABETES MELLITUS (HCC): ICD-10-CM

## 2021-03-11 DIAGNOSIS — E11.65 TYPE 2 DIABETES MELLITUS WITH HYPERGLYCEMIA, WITH LONG-TERM CURRENT USE OF INSULIN (HCC): Primary | ICD-10-CM

## 2021-03-11 DIAGNOSIS — E78.2 MIXED HYPERLIPIDEMIA: ICD-10-CM

## 2021-03-11 DIAGNOSIS — Z79.4 TYPE 2 DIABETES MELLITUS WITH HYPERGLYCEMIA, WITH LONG-TERM CURRENT USE OF INSULIN (HCC): Primary | ICD-10-CM

## 2021-03-11 DIAGNOSIS — F17.210 CIGARETTE SMOKER: ICD-10-CM

## 2021-03-11 PROCEDURE — 99214 OFFICE O/P EST MOD 30 MIN: CPT | Performed by: NURSE PRACTITIONER

## 2021-03-11 RX ORDER — DULAGLUTIDE 3 MG/.5ML
3 INJECTION, SOLUTION SUBCUTANEOUS WEEKLY
Qty: 4 PEN | Refills: 11 | Status: SHIPPED | OUTPATIENT
Start: 2021-03-11 | End: 2021-07-23 | Stop reason: SINTOL

## 2021-03-11 NOTE — PROGRESS NOTES
"Chief Complaint  Diabetes    Subjective          Jeremy Siu presents to [facility] ENDOCRINOLOGY  History of Present Illness     In office visit       50 year old female presents for follow up     Reason diabetes mellitus type 2     Duration - diagnosed in 2010      Timing constant     Quality not controlled     Severity high              Severity (Complication/Hospitalizations)          Secondary Macrovascular - none          Secondary Microvascular-- neuropathy, no diabetic retinopathy, no renal disease         Context--- complained of increased thirst and urination             Diabetes Regimen-- oral medications, insulin      Lab Results   Component Value Date    HGBA1C 9.0 (H) 01/16/2017             Blood Glucose Readings     Checking twice a week          states running high     This am 383           Diet-  unhealthy diet         Associated Signs/Symptoms       Hyperglycemic Symptoms: none        Hypoglycemic Episodes: none                Objective   Vital Signs:   /72   Pulse 115   Ht 185.4 cm (73\")   Wt 115 kg (252 lb 14.4 oz)   SpO2 97%   BMI 33.37 kg/m²     Physical Exam  Constitutional:       Appearance: Normal appearance.   HENT:      Head: Normocephalic.      Nose: Nose normal.   Eyes:      Conjunctiva/sclera: Conjunctivae normal.      Pupils: Pupils are equal, round, and reactive to light.   Cardiovascular:      Rate and Rhythm: Regular rhythm.      Heart sounds: Normal heart sounds.   Pulmonary:      Breath sounds: Normal breath sounds.   Musculoskeletal:         General: Normal range of motion.      Cervical back: Normal range of motion.   Skin:     Coloration: Skin is not pale.   Neurological:      General: No focal deficit present.      Mental Status: He is alert.   Psychiatric:         Mood and Affect: Mood normal.         Thought Content: Thought content normal.         Judgment: Judgment normal.        Result Review :   The following data was reviewed by: " CHUN Kerr on 03/11/2021:                Assessment and Plan    Diagnoses and all orders for this visit:    1. Type 2 diabetes mellitus with hyperglycemia, with long-term current use of insulin (CMS/[facility]) (Primary)    2. Diabetic autonomic neuropathy associated with type 2 diabetes mellitus (CMS/[facility])    3. Mixed hyperlipidemia    4. Cigarette smoker    Other orders  -     Dulaglutide (Trulicity) 3 MG/0.5ML solution pen-injector; Inject 3 mg under the skin into the appropriate area as directed 1 (One) Time Per Week.  Dispense: 4 pen; Refill: 11  -     Ertugliflozin L-PyroglutamicAc (Steglatro) 15 MG tablet; Take 1 tablet by mouth Every Morning.  Dispense: 30 tablet; Refill: 11                 Glycemic Management        Diabetes mellitus type 2               He states the last A1c was 8.6%             taking Basaglar 50 units ----increase to 55 units       Taking metformin 1000 mg BID               Bydureon 2 mg once weekly --taking --- not effective     Change to Trulicity 3 mg once weekly     Side effects nausea     If vomiting or abdominal pain stop medication               steglatro 5 mg one daily ---increase to 15 mg      Increase water intake by 2 glasses per day     Side effects discussed         Admelog         Taking 18 units      151- 200          2 units   201-250           4 units   251-300           6 units   Above 301       8 units            Goals for sugar     Fasting and before meals 80 to 130     2 hours after meals 180 or less        Aim for 60  grams of carbohydrate per meal     Aim for 15 grams of carbohydrate per snack                        Lipid Management     Taking  lipitor 40 mg daily            Triglycerides   Date Value Ref Range Status   03/25/2016 265 (H) 20 - 199 mg/dl Final     Comment:     TRIG AVERAGE RISK: 200 - 399 MG/DL     HDL Cholesterol   Date Value Ref Range Status   03/25/2016 32 (L) 60 - 200 mg/dl Final     Comment:     HDL HIGH RISK: < 35 MG/DL     LDL  Cholesterol    Date Value Ref Range Status   03/25/2016 111 0 - 129 mg/dl Final     Comment:     Calculated LDL results only valid if Triglycerides are < 400 mg/dL.  LDL DESIRED: < 130 MG/DL          Blood Pressure Management        Taking Lisinopril 5 mg daily         Microvascular Complication Monitoring        Last eye exam --- Nov. 2018 , no DR      Wait on eye exam due to hyperglycemia      No results found for: MICROALBUR, [id number]           Gabapentin -- 600 mg tid --- not effective      Try Lyrica 100 mg TID      [facility] controlled substance policy discussed.   Mark reviewed and appropriate              Bone Health                   Other Diabetes Related Aspects        No results found for: [id number]       Thyroid Health        No results found for: TSH       Preventive care     Smoker      Jeremy Siu  reports that he has been smoking cigarettes. He has been smoking about 1.00 pack per day. He has never used smokeless tobacco.. I have educated him on the risk of diseases from using tobacco products such as cancer, COPD and heart disease.     I advised him to quit and he is not willing to quit.    I spent 3  minutes counseling the patient.                   Follow Up   Return in about 6 weeks (around 4/22/2021) for Recheck.  Patient was given instructions and counseling regarding his condition or for health maintenance advice. Please see specific information pulled into the AVS if appropriate.

## 2021-04-23 ENCOUNTER — LAB (OUTPATIENT)
Dept: LAB | Facility: HOSPITAL | Age: 51
End: 2021-04-23

## 2021-04-23 ENCOUNTER — OFFICE VISIT (OUTPATIENT)
Dept: ENDOCRINOLOGY | Facility: CLINIC | Age: 51
End: 2021-04-23

## 2021-04-23 VITALS
DIASTOLIC BLOOD PRESSURE: 80 MMHG | SYSTOLIC BLOOD PRESSURE: 132 MMHG | BODY MASS INDEX: 33.4 KG/M2 | WEIGHT: 252 LBS | HEIGHT: 73 IN | HEART RATE: 98 BPM

## 2021-04-23 DIAGNOSIS — E11.43 DIABETIC AUTONOMIC NEUROPATHY ASSOCIATED WITH TYPE 2 DIABETES MELLITUS (HCC): ICD-10-CM

## 2021-04-23 DIAGNOSIS — F17.210 CIGARETTE SMOKER: ICD-10-CM

## 2021-04-23 DIAGNOSIS — E78.2 MIXED HYPERLIPIDEMIA: ICD-10-CM

## 2021-04-23 DIAGNOSIS — E11.65 TYPE 2 DIABETES MELLITUS WITH HYPERGLYCEMIA, WITH LONG-TERM CURRENT USE OF INSULIN (HCC): Primary | ICD-10-CM

## 2021-04-23 DIAGNOSIS — E55.9 VITAMIN D DEFICIENCY: ICD-10-CM

## 2021-04-23 DIAGNOSIS — Z79.4 TYPE 2 DIABETES MELLITUS WITH HYPERGLYCEMIA, WITH LONG-TERM CURRENT USE OF INSULIN (HCC): Primary | ICD-10-CM

## 2021-04-23 LAB
25(OH)D3 SERPL-MCNC: 9.6 NG/ML
VIT B12 BLD-MCNC: 466 PG/ML (ref 211–946)

## 2021-04-23 PROCEDURE — 83036 HEMOGLOBIN GLYCOSYLATED A1C: CPT | Performed by: NURSE PRACTITIONER

## 2021-04-23 PROCEDURE — 99214 OFFICE O/P EST MOD 30 MIN: CPT | Performed by: NURSE PRACTITIONER

## 2021-04-23 PROCEDURE — 80050 GENERAL HEALTH PANEL: CPT | Performed by: NURSE PRACTITIONER

## 2021-04-23 PROCEDURE — 36415 COLL VENOUS BLD VENIPUNCTURE: CPT | Performed by: NURSE PRACTITIONER

## 2021-04-23 PROCEDURE — 82306 VITAMIN D 25 HYDROXY: CPT | Performed by: NURSE PRACTITIONER

## 2021-04-23 PROCEDURE — 82607 VITAMIN B-12: CPT | Performed by: NURSE PRACTITIONER

## 2021-04-23 RX ORDER — INSULIN GLARGINE 100 [IU]/ML
70 INJECTION, SOLUTION SUBCUTANEOUS NIGHTLY
Qty: 7 PEN | Refills: 11 | Status: SHIPPED | OUTPATIENT
Start: 2021-04-23

## 2021-04-23 RX ORDER — INSULIN ASPART 100 [IU]/ML
40 INJECTION, SOLUTION INTRAVENOUS; SUBCUTANEOUS
Qty: 9 PEN | Refills: 11 | Status: SHIPPED | OUTPATIENT
Start: 2021-04-23 | End: 2023-01-18 | Stop reason: SDUPTHER

## 2021-04-23 NOTE — PROGRESS NOTES
"Chief Complaint  No chief complaint on file.    Subjective          Jeremy Siu presents to [facility] ENDOCRINOLOGY  History of Present Illness     In office visit     50 year old female presents for follow up     Reason diabetes mellitus type 2     Timing constant     Quality not controlled    Duration -  Diagnosed in 2010     Severity high        Severity (Complication/Hospitalizations)           Secondary Macrovascular - none           Secondary Microvascular-- neuropathy, no diabetic retinopathy, no renal disease         Context--- complained of increased thirst and urination             Diabetes Regimen-- oral medications, insulin      Lab Results   Component Value Date    HGBA1C 9.0 (H) 01/16/2017             Blood Glucose Readings     Checking twice a week            this am was 282     Still above 200         Diet-  unhealthy diet         Associated Signs/Symptoms       Hyperglycemic Symptoms: none        Hypoglycemic Episodes: none            Review of Systems - General ROS: positive for  - fatigue             Objective   Vital Signs:   /80 (BP Location: Right arm, Patient Position: Sitting)   Pulse 98   Ht 185.4 cm (73\")   Wt 114 kg (252 lb)   BMI 33.25 kg/m²     Physical Exam  Constitutional:       Appearance: Normal appearance.   HENT:      Head: Normocephalic.   Eyes:      Conjunctiva/sclera: Conjunctivae normal.      Pupils: Pupils are equal, round, and reactive to light.   Cardiovascular:      Rate and Rhythm: Regular rhythm.      Heart sounds: Normal heart sounds.   Pulmonary:      Breath sounds: Normal breath sounds.   Musculoskeletal:         General: Normal range of motion.      Cervical back: Neck supple.   Neurological:      General: No focal deficit present.      Mental Status: He is alert.   Psychiatric:         Mood and Affect: Mood normal.         Thought Content: Thought content normal.         Judgment: Judgment normal.        Result Review :   The " following data was reviewed by: CHUN Kerr on 04/23/2021:                Assessment and Plan    Diagnoses and all orders for this visit:    1. Type 2 diabetes mellitus with hyperglycemia, with long-term current use of insulin (CMS/[facility]) (Primary)  -     CBC & Differential  -     Comprehensive Metabolic Panel  -     Hemoglobin A1c  -     TSH  -     Vitamin B12  -     Vitamin D 25 Hydroxy    2. Diabetic autonomic neuropathy associated with type 2 diabetes mellitus (CMS/[facility])  -     CBC & Differential  -     Comprehensive Metabolic Panel  -     Hemoglobin A1c  -     TSH  -     Vitamin B12  -     Vitamin D 25 Hydroxy    3. Cigarette smoker    4. Mixed hyperlipidemia  -     CBC & Differential  -     Comprehensive Metabolic Panel  -     Hemoglobin A1c  -     TSH  -     Vitamin B12  -     Vitamin D 25 Hydroxy    5. Vitamin D deficiency  -     CBC & Differential  -     Comprehensive Metabolic Panel  -     Hemoglobin A1c  -     TSH  -     Vitamin B12  -     Vitamin D 25 Hydroxy    Other orders  -     insulin aspart (NovoLOG FlexPen) 100 UNIT/ML solution pen-injector sc pen; Inject 40 Units under the skin into the appropriate area as directed 3 (Three) Times a Day With Meals.  Dispense: 9 pen; Refill: 11  -     Insulin Glargine (BASAGLAR KWIKPEN) 100 UNIT/ML injection pen; Inject 70 Units under the skin into the appropriate area as directed Every Night.  Dispense: 7 pen; Refill: 11               Glycemic Management        Diabetes mellitus type 2                 He states the last A1c was 8.6%             taking Basaglar 60 units --- increase to 65 units         Taking metformin 1000 mg BID               stopped  Bydureon 2 mg once weekly - not effective       Trulicity 3 mg once weekly taking      Side effects nausea     If vomiting or abdominal pain stop medication                steglatro  15 mg      Increase water intake by 2 glasses per day     Side effects discussed         Admelog         Taking 24 units  for TID before each meal --- increase to 30 units      151- 200          2 units   201-250           4 units   251-300           6 units   Above 301       8 units            Goals for sugar     Fasting and before meals 80 to 130     2 hours after meals 180 or less        Aim for 60  grams of carbohydrate per meal     Aim for 15 grams of carbohydrate per snack                        Lipid Management     Taking  lipitor 40 mg daily                    Triglycerides   Date Value Ref Range Status   03/25/2016 265 (H) 20 - 199 mg/dl Final       Comment:       TRIG AVERAGE RISK: 200 - 399 MG/DL              HDL Cholesterol   Date Value Ref Range Status   03/25/2016 32 (L) 60 - 200 mg/dl Final       Comment:       HDL HIGH RISK: < 35 MG/DL      LDL Cholesterol    Date Value Ref Range Status   03/25/2016 111 0 - 129 mg/dl Final       Comment:       Calculated LDL results only valid if Triglycerides are < 400 mg/dL.  LDL DESIRED: < 130 MG/DL            Blood Pressure Management        Taking Lisinopril 5 mg daily         Microvascular Complication Monitoring        Last eye exam --- Nov. 2018 , no DR      Wait on eye exam due to hyperglycemia                 Gabapentin -- 600 mg tid --- not effective       Lyrica 100 mg TID --- stopped due side effects      [facility] controlled substance policy discussed.   Mark reviewed and appropriate              Bone Health                 Lab Results   Component Value Date    CALCIUM 8.7 01/16/2017          Other Diabetes Related Aspects     No results found for: [id number]          Thyroid Health        No results found for: TSH        Preventive care     Smoker    Jeremy Siu  reports that he has been smoking cigarettes. He has been smoking about 1.00 pack per day. He has never used smokeless tobacco.. I have educated him on the risk of diseases from using tobacco products such as cancer, COPD and heart disease.     I advised him to quit and he is not willing to  quit.    I spent 3  minutes counseling the patient.                  Follow Up   No follow-ups on file.  Patient was given instructions and counseling regarding his condition or for health maintenance advice. Please see specific information pulled into the AVS if appropriate.

## 2021-04-24 LAB
ALBUMIN SERPL-MCNC: 3.9 G/DL (ref 3.5–5.2)
ALBUMIN/GLOB SERPL: 1.3 G/DL
ALP SERPL-CCNC: 94 U/L (ref 39–117)
ALT SERPL W P-5'-P-CCNC: 44 U/L (ref 1–41)
ANION GAP SERPL CALCULATED.3IONS-SCNC: 11.2 MMOL/L (ref 5–15)
AST SERPL-CCNC: 35 U/L (ref 1–40)
BASOPHILS # BLD AUTO: 0.08 10*3/MM3 (ref 0–0.2)
BASOPHILS NFR BLD AUTO: 0.8 % (ref 0–1.5)
BILIRUB SERPL-MCNC: 0.3 MG/DL (ref 0–1.2)
BUN SERPL-MCNC: 9 MG/DL (ref 6–20)
BUN/CREAT SERPL: 14.8 (ref 7–25)
CALCIUM SPEC-SCNC: 9.1 MG/DL (ref 8.6–10.5)
CHLORIDE SERPL-SCNC: 100 MMOL/L (ref 98–107)
CO2 SERPL-SCNC: 28.8 MMOL/L (ref 22–29)
CREAT SERPL-MCNC: 0.61 MG/DL (ref 0.76–1.27)
DEPRECATED RDW RBC AUTO: 43.7 FL (ref 37–54)
EOSINOPHIL # BLD AUTO: 0.83 10*3/MM3 (ref 0–0.4)
EOSINOPHIL NFR BLD AUTO: 8.8 % (ref 0.3–6.2)
ERYTHROCYTE [DISTWIDTH] IN BLOOD BY AUTOMATED COUNT: 12.8 % (ref 12.3–15.4)
GFR SERPL CREATININE-BSD FRML MDRD: 141 ML/MIN/1.73
GLOBULIN UR ELPH-MCNC: 3.1 GM/DL
GLUCOSE SERPL-MCNC: 287 MG/DL (ref 65–99)
HBA1C MFR BLD: 12.1 % (ref 4.8–5.6)
HCT VFR BLD AUTO: 52.1 % (ref 37.5–51)
HGB BLD-MCNC: 18.3 G/DL (ref 13–17.7)
IMM GRANULOCYTES # BLD AUTO: 0.04 10*3/MM3 (ref 0–0.05)
IMM GRANULOCYTES NFR BLD AUTO: 0.4 % (ref 0–0.5)
LYMPHOCYTES # BLD AUTO: 2.72 10*3/MM3 (ref 0.7–3.1)
LYMPHOCYTES NFR BLD AUTO: 28.8 % (ref 19.6–45.3)
MCH RBC QN AUTO: 32.9 PG (ref 26.6–33)
MCHC RBC AUTO-ENTMCNC: 35.1 G/DL (ref 31.5–35.7)
MCV RBC AUTO: 93.5 FL (ref 79–97)
MONOCYTES # BLD AUTO: 0.5 10*3/MM3 (ref 0.1–0.9)
MONOCYTES NFR BLD AUTO: 5.3 % (ref 5–12)
NEUTROPHILS NFR BLD AUTO: 5.26 10*3/MM3 (ref 1.7–7)
NEUTROPHILS NFR BLD AUTO: 55.9 % (ref 42.7–76)
NRBC BLD AUTO-RTO: 0.1 /100 WBC (ref 0–0.2)
PLATELET # BLD AUTO: 196 10*3/MM3 (ref 140–450)
PMV BLD AUTO: 12 FL (ref 6–12)
POTASSIUM SERPL-SCNC: 4.3 MMOL/L (ref 3.5–5.2)
PROT SERPL-MCNC: 7 G/DL (ref 6–8.5)
RBC # BLD AUTO: 5.57 10*6/MM3 (ref 4.14–5.8)
SODIUM SERPL-SCNC: 140 MMOL/L (ref 136–145)
TSH SERPL DL<=0.05 MIU/L-ACNC: 0.64 UIU/ML (ref 0.27–4.2)
WBC # BLD AUTO: 9.43 10*3/MM3 (ref 3.4–10.8)

## 2021-06-21 ENCOUNTER — DOCUMENTATION (OUTPATIENT)
Dept: ENDOCRINOLOGY | Facility: CLINIC | Age: 51
End: 2021-06-21

## 2021-06-30 ENCOUNTER — TELEPHONE (OUTPATIENT)
Dept: ENDOCRINOLOGY | Facility: CLINIC | Age: 51
End: 2021-06-30

## 2021-07-02 ENCOUNTER — TELEPHONE (OUTPATIENT)
Dept: ENDOCRINOLOGY | Facility: CLINIC | Age: 51
End: 2021-07-02

## 2021-07-02 NOTE — TELEPHONE ENCOUNTER
I dont know who to send to; he cannot take those because he is on [facility]

## 2021-07-02 NOTE — TELEPHONE ENCOUNTER
Pt left a vm stating that he is needing a call back to talk about a medication that the insurance won't fill now.

## 2021-07-15 ENCOUNTER — DOCUMENTATION (OUTPATIENT)
Dept: ENDOCRINOLOGY | Facility: CLINIC | Age: 51
End: 2021-07-15

## 2021-07-23 ENCOUNTER — OFFICE VISIT (OUTPATIENT)
Dept: ENDOCRINOLOGY | Facility: CLINIC | Age: 51
End: 2021-07-23

## 2021-07-23 VITALS
HEIGHT: 73 IN | WEIGHT: 248.3 LBS | BODY MASS INDEX: 32.91 KG/M2 | HEART RATE: 99 BPM | DIASTOLIC BLOOD PRESSURE: 68 MMHG | OXYGEN SATURATION: 96 % | SYSTOLIC BLOOD PRESSURE: 126 MMHG

## 2021-07-23 DIAGNOSIS — E11.65 TYPE 2 DIABETES MELLITUS WITH HYPERGLYCEMIA, WITH LONG-TERM CURRENT USE OF INSULIN (HCC): Primary | ICD-10-CM

## 2021-07-23 DIAGNOSIS — Z79.4 TYPE 2 DIABETES MELLITUS WITH HYPERGLYCEMIA, WITH LONG-TERM CURRENT USE OF INSULIN (HCC): Primary | ICD-10-CM

## 2021-07-23 DIAGNOSIS — E78.2 MIXED HYPERLIPIDEMIA: ICD-10-CM

## 2021-07-23 DIAGNOSIS — F17.210 CIGARETTE SMOKER: ICD-10-CM

## 2021-07-23 DIAGNOSIS — E11.43 DIABETIC AUTONOMIC NEUROPATHY ASSOCIATED WITH TYPE 2 DIABETES MELLITUS (HCC): ICD-10-CM

## 2021-07-23 PROCEDURE — 99214 OFFICE O/P EST MOD 30 MIN: CPT | Performed by: NURSE PRACTITIONER

## 2021-07-23 RX ORDER — BACLOFEN 10 MG/1
10 TABLET ORAL EVERY 8 HOURS SCHEDULED
COMMUNITY

## 2021-07-23 RX ORDER — PROCHLORPERAZINE 25 MG/1
1 SUPPOSITORY RECTAL
Qty: 1 EACH | Refills: 3 | Status: SHIPPED | OUTPATIENT
Start: 2021-07-23

## 2021-07-23 RX ORDER — HYDROCODONE BITARTRATE AND ACETAMINOPHEN 5; 325 MG/1; MG/1
1 TABLET ORAL EVERY 6 HOURS SCHEDULED
COMMUNITY

## 2021-07-23 RX ORDER — PROCHLORPERAZINE 25 MG/1
1 SUPPOSITORY RECTAL ONCE
Qty: 1 EACH | Refills: 1 | Status: SHIPPED | OUTPATIENT
Start: 2021-07-23 | End: 2021-07-23

## 2021-07-23 RX ORDER — PROCHLORPERAZINE 25 MG/1
1 SUPPOSITORY RECTAL AS NEEDED
Qty: 9 EACH | Refills: 3 | Status: SHIPPED | OUTPATIENT
Start: 2021-07-23 | End: 2022-12-13

## 2021-07-23 RX ORDER — EMPAGLIFLOZIN AND LINAGLIPTIN 10; 5 MG/1; MG/1
1 TABLET, FILM COATED ORAL DAILY
Qty: 30 TABLET | Refills: 11 | Status: SHIPPED | OUTPATIENT
Start: 2021-07-23 | End: 2022-08-10

## 2021-07-23 NOTE — PROGRESS NOTES
"Chief Complaint  Diabetes    Subjective          Jeremy Siu presents to [facility] ENDOCRINOLOGY  History of Present Illness         In office visit    50-year-old male presents for follow-up    Reason diabetes mellitus type 2    Duration since two thousand ten    Timing constant    Quality not controlled    Severity is high                 Secondary Macrovascular - none           Secondary Microvascular-- neuropathy, no diabetic retinopathy, no renal disease         Context--- complained of increased thirst and urination             Diabetes Regimen-- oral medications, insulin            Lab Results   Component Value Date     HGBA1C 9.0 (H) 01/16/2017               Blood Glucose Readings     Checks 4 times daily     This am was 414     States has been out of insulin                  Diet-  unhealthy diet           Review of Systems - General ROS: negative                   Objective   Vital Signs:   /68   Pulse 99   Ht 185.4 cm (73\")   Wt 113 kg (248 lb 4.8 oz)   SpO2 96%   BMI 32.76 kg/m²     Physical Exam  Constitutional:       Appearance: Normal appearance.   Cardiovascular:      Rate and Rhythm: Regular rhythm.      Heart sounds: Normal heart sounds.   Pulmonary:      Breath sounds: Normal breath sounds.   Musculoskeletal:         General: Normal range of motion.      Cervical back: Normal range of motion.   Skin:     Coloration: Skin is not pale.   Neurological:      General: No focal deficit present.      Mental Status: He is alert.        Result Review :   The following data was reviewed by: CHUN Kerr on 07/23/2021:  Common labs    Common Labsle 4/23/21 4/23/21 4/23/21    0838 0838 0838   Glucose   287 (A)   BUN   9   Creatinine   0.61 (A)   eGFR Non African Am   141   Sodium   140   Potassium   4.3   Chloride   100   Calcium   9.1   Albumin   3.90   Total Bilirubin   0.3   Alkaline Phosphatase   94   AST (SGOT)   35   ALT (SGPT)   44 (A)   WBC 9.43   "   Hemoglobin 18.3 (A)     Hematocrit 52.1 (A)     Platelets 196     Hemoglobin A1C  12.10 (A)    (A) Abnormal value                      Assessment and Plan    Diagnoses and all orders for this visit:    1. Type 2 diabetes mellitus with hyperglycemia, with long-term current use of insulin (CMS/[facility]) (Primary)    2. Diabetic autonomic neuropathy associated with type 2 diabetes mellitus (CMS/[facility])    3. Mixed hyperlipidemia    4. Cigarette smoker    Other orders  -     Empagliflozin-linaGLIPtin (Glyxambi) 10-5 MG tablet; Take 1 tablet by mouth Daily.  Dispense: 30 tablet; Refill: 11  -     metFORMIN (GLUCOPHAGE) 1000 MG tablet; Take 1 tablet by mouth 2 (Two) Times a Day With Meals.  Dispense: 60 tablet; Refill: 5             Glycemic Management        Diabetes mellitus type 2                 He states the last A1c was 8.6%             taking Basaglar 60 units -increase to 65 units         Taking metformin 1000 mg BID               stopped  Bydureon 2 mg once weekly - not effective       stopped Trulicity 3 mg once weekly taking ---side effects      Side effects nausea     If vomiting or abdominal pain stop medication                steglatro  15 mg ---no longer covered      Increase water intake by 2 glasses per day     Side effects discussed     Change to Glyxambi 10/5 mg once daily         Admelog         Taking 24 units for TID before each meal --increase to 30 units TID      151- 200          2 units   201-250           4 units   251-300           6 units   Above 301       8 units            Goals for sugar     Fasting and before meals 80 to 130     2 hours after meals 180 or less        Aim for 60  grams of carbohydrate per meal     Aim for 15 grams of carbohydrate per snack             dexcom        The patient has diabetes mellitus, insulin-dependent.     Our Diabetes Department has evaluated the patient in the last six months and will continue counseling on insulin adjustment.      The patient performs blood  glucose testing at least four times daily with proven glucose variability from 50 to 300 mg per dl.     The patient is administering basal insulin and prandial insulin four times per day for more than six months.     The patient uses a home blood glucose monitor to assess blood glucose at least four times daily for more than six months.     The patient requires frequent adjustment of insulin treatment regimen based on blood glucose readings.     The patient has frequent variability in blood glucose readings due to activity and variability in meal content and time.      The patient has completed a diabetes education program with us.     The patient has demonstrated the ability to self-monitor her glucose.      The patient is motivated in improving diabetes control      The patient has hypoglycemia unawareness            Lipid Management     Taking  lipitor 40 mg daily                   Blood Pressure Management        Taking Lisinopril 5 mg daily         Microvascular Complication Monitoring        Last eye exam --- Nov. 2018 , no DR      Wait on eye exam due to hyperglycemia                  Gabapentin -- 600 mg tid --- not effective       Lyrica 100 mg TID --- stopped due side effects      [facility] controlled substance policy discussed.   Mark reviewed and appropriate              Bone Health                       Lab Results   Component Value Date     CALCIUM 8.7 01/16/2017            Other Diabetes Related Aspects       Lab Results   Component Value Date    CALCIUM 9.1 04/23/2021                Thyroid Health          Lab Results   Component Value Date    TSH 0.636 04/23/2021             Preventive care     Smoker        Jeremy Siu  reports that he has been smoking cigarettes. He has been smoking about 1.00 pack per day. He has never used smokeless tobacco.. I have educated him on the risk of diseases from using tobacco products such as cancer, COPD and heart disease.     I advised him to quit  and he is not willing to quit.    I spent 3  minutes counseling the patient.                      Follow Up   Return in about 3 months (around 10/23/2021) for Recheck.  Patient was given instructions and counseling regarding his condition or for health maintenance advice. Please see specific information pulled into the AVS if appropriate.

## 2021-10-26 ENCOUNTER — TELEMEDICINE (OUTPATIENT)
Dept: ENDOCRINOLOGY | Facility: CLINIC | Age: 51
End: 2021-10-26

## 2021-10-26 DIAGNOSIS — E78.2 MIXED HYPERLIPIDEMIA: ICD-10-CM

## 2021-10-26 DIAGNOSIS — I10 PRIMARY HYPERTENSION: ICD-10-CM

## 2021-10-26 DIAGNOSIS — E11.42 DIABETIC POLYNEUROPATHY ASSOCIATED WITH TYPE 2 DIABETES MELLITUS (HCC): ICD-10-CM

## 2021-10-26 DIAGNOSIS — Z79.4 TYPE 2 DIABETES MELLITUS WITH HYPERGLYCEMIA, WITH LONG-TERM CURRENT USE OF INSULIN (HCC): Primary | ICD-10-CM

## 2021-10-26 DIAGNOSIS — F17.200 SMOKING: ICD-10-CM

## 2021-10-26 DIAGNOSIS — E11.65 TYPE 2 DIABETES MELLITUS WITH HYPERGLYCEMIA, WITH LONG-TERM CURRENT USE OF INSULIN (HCC): Primary | ICD-10-CM

## 2021-10-26 PROCEDURE — 99214 OFFICE O/P EST MOD 30 MIN: CPT | Performed by: NURSE PRACTITIONER

## 2021-10-26 NOTE — PROGRESS NOTES
Chief Complaint  No chief complaint on file.    Subjective          Jeremy Siu presents to [facility] ENDOCRINOLOGY  History of Present Illness       You have chosen to receive care through a telehealth visit.  Do you consent to use a video/audio connection for your medical care today? Yes            TELEHEALTH VIDEO VISIT     This a video visit due to [facility] current guidelines for social distancing due to the COVID 19 pandemic      50-year-old male presents for follow-up    Reason diabetes mellitus type 2    Timing constant     Quality not controlled    Duration 2010                   Macrovascular - none        Microvascular-- neuropathy, no diabetic retinopathy, no renal disease                Diabetes Regimen-- oral medications, insulin           Blood Glucose Readings     Checks 4 times daily        Am 200 up to 240     Daytime at goal     Denies late night snacking                 Objective   Vital Signs:   There were no vitals taken for this visit.    Physical Exam  Neurological:      General: No focal deficit present.      Mental Status: He is alert.   Psychiatric:         Mood and Affect: Mood normal.         Thought Content: Thought content normal.        Result Review :   The following data was reviewed by: CHUN Kerr on 10/26/2021:  Common labs    Common Labsle 4/23/21 4/23/21 4/23/21    0838 0838 0838   Glucose   287 (A)   BUN   9   Creatinine   0.61 (A)   eGFR Non African Am   141   Sodium   140   Potassium   4.3   Chloride   100   Calcium   9.1   Albumin   3.90   Total Bilirubin   0.3   Alkaline Phosphatase   94   AST (SGOT)   35   ALT (SGPT)   44 (A)   WBC 9.43     Hemoglobin 18.3 (A)     Hematocrit 52.1 (A)     Platelets 196     Hemoglobin A1C  12.10 (A)    (A) Abnormal value                      Assessment and Plan    Diagnoses and all orders for this visit:    1. Type 2 diabetes mellitus with hyperglycemia, with long-term current use of insulin  ([facility]) (Primary)  -     Hemoglobin A1c    2. Mixed hyperlipidemia    3. Diabetic polyneuropathy associated with type 2 diabetes mellitus ([facility])    4. Primary hypertension    5. Smoking           Glycemic Management        Diabetes mellitus type 2         using dexcom -- cannot download             He states the last A1c was 8.6%             taking Basaglar 65 units --increase to 68 units         Taking metformin 1000 mg BID           Glyxambi 10/5 mg once daily         Admelog --taking 16 up to 18 untis TID           151- 200          2 units   201-250           4 units   251-300           6 units   Above 301       8 units            Goals for sugar     Fasting and before meals 80 to 130     2 hours after meals 180 or less        Aim for 60  grams of carbohydrate per meal     Aim for 15 grams of carbohydrate per snack             stopped  Bydureon 2 mg once weekly - not effective       stopped Trulicity 3 mg once weekly taking ---side effects                     steglatro  15 mg ---no longer covered              Lipid Management     Taking  lipitor 40 mg daily                     Blood Pressure Management        Taking Lisinopril 5 mg daily         Microvascular Complication Monitoring        Last eye exam --- Nov. 2018 , no DR      Wait on eye exam due to hyperglycemia                  Gabapentin -- 600 mg tid --- not effective       Lyrica 100 mg TID --- stopped due side effects      [facility] controlled substance policy discussed.   Mark reviewed and appropriate              Bone Health        Lab Results   Component Value Date    CALCIUM 9.1 04/23/2021          Other Diabetes Related Aspects        Lab Results   Component Value Date    [id number] 466 04/23/2021             Thyroid Health     Lab Results   Component Value Date    TSH 0.636 04/23/2021             Preventive care     Smoker     Jeremy Siu  reports that he has been smoking cigarettes. He has been smoking about 1.00 pack per day. He has  never used smokeless tobacco.. I have educated him on the risk of diseases from using tobacco products such as cancer and COPD.     I advised him to quit and he is not willing to quit.    I spent 3  minutes counseling the patient.                               Follow Up   Return in about 3 months (around 1/26/2022) for Recheck.  Patient was given instructions and counseling regarding his condition or for health maintenance advice. Please see specific information pulled into the AVS if appropriate.         This document has been electronically signed by CHUN Kerr on October 26, 2021 08:32 CDT.

## 2022-01-17 ENCOUNTER — TRANSCRIBE ORDERS (OUTPATIENT)
Dept: GENERAL RADIOLOGY | Facility: CLINIC | Age: 52
End: 2022-01-17

## 2022-01-17 DIAGNOSIS — M51.36 DEGENERATION OF LUMBAR INTERVERTEBRAL DISC: Primary | ICD-10-CM

## 2022-02-01 ENCOUNTER — LAB (OUTPATIENT)
Dept: LAB | Facility: HOSPITAL | Age: 52
End: 2022-02-01

## 2022-02-01 ENCOUNTER — OFFICE VISIT (OUTPATIENT)
Dept: ENDOCRINOLOGY | Facility: CLINIC | Age: 52
End: 2022-02-01

## 2022-02-01 VITALS
OXYGEN SATURATION: 97 % | HEIGHT: 73 IN | WEIGHT: 240.9 LBS | SYSTOLIC BLOOD PRESSURE: 126 MMHG | HEART RATE: 129 BPM | DIASTOLIC BLOOD PRESSURE: 68 MMHG | BODY MASS INDEX: 31.93 KG/M2

## 2022-02-01 DIAGNOSIS — E78.2 MIXED HYPERLIPIDEMIA: ICD-10-CM

## 2022-02-01 DIAGNOSIS — E11.65 TYPE 2 DIABETES MELLITUS WITH HYPERGLYCEMIA, WITH LONG-TERM CURRENT USE OF INSULIN: Primary | ICD-10-CM

## 2022-02-01 DIAGNOSIS — I10 PRIMARY HYPERTENSION: ICD-10-CM

## 2022-02-01 DIAGNOSIS — Z79.4 TYPE 2 DIABETES MELLITUS WITH HYPERGLYCEMIA, WITH LONG-TERM CURRENT USE OF INSULIN: Primary | ICD-10-CM

## 2022-02-01 DIAGNOSIS — F17.200 SMOKING: ICD-10-CM

## 2022-02-01 LAB
ALBUMIN SERPL-MCNC: 4.4 G/DL (ref 3.5–5.2)
ALBUMIN/GLOB SERPL: 1.4 G/DL
ALP SERPL-CCNC: 105 U/L (ref 39–117)
ALT SERPL W P-5'-P-CCNC: 20 U/L (ref 1–41)
ANION GAP SERPL CALCULATED.3IONS-SCNC: 10 MMOL/L (ref 5–15)
AST SERPL-CCNC: 30 U/L (ref 1–40)
BASOPHILS # BLD AUTO: 0.11 10*3/MM3 (ref 0–0.2)
BASOPHILS NFR BLD AUTO: 1 % (ref 0–1.5)
BILIRUB SERPL-MCNC: 0.4 MG/DL (ref 0–1.2)
BUN SERPL-MCNC: 16 MG/DL (ref 6–20)
BUN/CREAT SERPL: 18.6 (ref 7–25)
CALCIUM SPEC-SCNC: 9.6 MG/DL (ref 8.6–10.5)
CHLORIDE SERPL-SCNC: 100 MMOL/L (ref 98–107)
CO2 SERPL-SCNC: 26 MMOL/L (ref 22–29)
CREAT SERPL-MCNC: 0.86 MG/DL (ref 0.76–1.27)
DEPRECATED RDW RBC AUTO: 39.8 FL (ref 37–54)
EOSINOPHIL # BLD AUTO: 0.78 10*3/MM3 (ref 0–0.4)
EOSINOPHIL NFR BLD AUTO: 7 % (ref 0.3–6.2)
ERYTHROCYTE [DISTWIDTH] IN BLOOD BY AUTOMATED COUNT: 12.3 % (ref 12.3–15.4)
GFR SERPL CREATININE-BSD FRML MDRD: 95 ML/MIN/1.73
GLOBULIN UR ELPH-MCNC: 3.2 GM/DL
GLUCOSE SERPL-MCNC: 294 MG/DL (ref 65–99)
HBA1C MFR BLD: 12.42 % (ref 4.8–5.6)
HCT VFR BLD AUTO: 50.9 % (ref 37.5–51)
HGB BLD-MCNC: 18.5 G/DL (ref 13–17.7)
IMM GRANULOCYTES # BLD AUTO: 0.04 10*3/MM3 (ref 0–0.05)
IMM GRANULOCYTES NFR BLD AUTO: 0.4 % (ref 0–0.5)
LYMPHOCYTES # BLD AUTO: 3.86 10*3/MM3 (ref 0.7–3.1)
LYMPHOCYTES NFR BLD AUTO: 34.6 % (ref 19.6–45.3)
MCH RBC QN AUTO: 32.2 PG (ref 26.6–33)
MCHC RBC AUTO-ENTMCNC: 36.3 G/DL (ref 31.5–35.7)
MCV RBC AUTO: 88.7 FL (ref 79–97)
MONOCYTES # BLD AUTO: 0.45 10*3/MM3 (ref 0.1–0.9)
MONOCYTES NFR BLD AUTO: 4 % (ref 5–12)
NEUTROPHILS NFR BLD AUTO: 5.9 10*3/MM3 (ref 1.7–7)
NEUTROPHILS NFR BLD AUTO: 53 % (ref 42.7–76)
NRBC BLD AUTO-RTO: 0 /100 WBC (ref 0–0.2)
PLATELET # BLD AUTO: 185 10*3/MM3 (ref 140–450)
PMV BLD AUTO: 11.5 FL (ref 6–12)
POTASSIUM SERPL-SCNC: 4.3 MMOL/L (ref 3.5–5.2)
PROT SERPL-MCNC: 7.6 G/DL (ref 6–8.5)
RBC # BLD AUTO: 5.74 10*6/MM3 (ref 4.14–5.8)
SODIUM SERPL-SCNC: 136 MMOL/L (ref 136–145)
WBC NRBC COR # BLD: 11.14 10*3/MM3 (ref 3.4–10.8)

## 2022-02-01 PROCEDURE — 99214 OFFICE O/P EST MOD 30 MIN: CPT | Performed by: NURSE PRACTITIONER

## 2022-02-01 PROCEDURE — 85025 COMPLETE CBC W/AUTO DIFF WBC: CPT | Performed by: NURSE PRACTITIONER

## 2022-02-01 PROCEDURE — 83036 HEMOGLOBIN GLYCOSYLATED A1C: CPT | Performed by: NURSE PRACTITIONER

## 2022-02-01 PROCEDURE — 80053 COMPREHEN METABOLIC PANEL: CPT | Performed by: NURSE PRACTITIONER

## 2022-02-01 PROCEDURE — 36415 COLL VENOUS BLD VENIPUNCTURE: CPT | Performed by: NURSE PRACTITIONER

## 2022-02-01 PROCEDURE — 95251 CONT GLUC MNTR ANALYSIS I&R: CPT | Performed by: NURSE PRACTITIONER

## 2022-02-01 NOTE — PROGRESS NOTES
"Chief Complaint  Diabetes    Subjective          Jeremy Siu presents to [facility] ENDOCRINOLOGY  History of Present Illness     In office visit     51 year old male presents for follow up       Diagnosed in 2010     Reason diabetes mellitus type 2     Timing constant      Quality not controlled     Lab Results   Component Value Date    HGBA1C 12.10 (H) 04/23/2021               Macrovascular - none        Microvascular-- neuropathy, no diabetic retinopathy, no renal disease                Diabetes Regimen-- oral medications, insulin            Blood Glucose Readings     Checks 4 times daily         States high     Uses dexcom     Review of Systems - General ROS: negative                            Objective   Vital Signs:   /68   Pulse (!) 129   Ht 185.4 cm (73\")   Wt 109 kg (240 lb 14.4 oz)   SpO2 97%   BMI 31.78 kg/m²     Physical Exam  Constitutional:       Appearance: Normal appearance.   Cardiovascular:      Rate and Rhythm: Regular rhythm.      Heart sounds: Normal heart sounds.   Pulmonary:      Breath sounds: Normal breath sounds.   Musculoskeletal:      Cervical back: Normal range of motion and neck supple.   Neurological:      General: No focal deficit present.      Mental Status: He is alert.   Psychiatric:         Mood and Affect: Mood normal.         Thought Content: Thought content normal.        Result Review :   The following data was reviewed by: CHUN Kerr on 02/01/2022:  Common labs    Common Labsle 4/23/21 4/23/21 4/23/21    0838 0838 0838   Glucose   287 (A)   BUN   9   Creatinine   0.61 (A)   eGFR Non African Am   141   Sodium   140   Potassium   4.3   Chloride   100   Calcium   9.1   Albumin   3.90   Total Bilirubin   0.3   Alkaline Phosphatase   94   AST (SGOT)   35   ALT (SGPT)   44 (A)   WBC 9.43     Hemoglobin 18.3 (A)     Hematocrit 52.1 (A)     Platelets 196     Hemoglobin A1C  12.10 (A)    (A) Abnormal value                "       Assessment and Plan    Diagnoses and all orders for this visit:    1. Type 2 diabetes mellitus with hyperglycemia, with long-term current use of insulin (HCC) (Primary)  -     CBC & Differential  -     Comprehensive Metabolic Panel  -     Hemoglobin A1c    2. Primary hypertension    3. Mixed hyperlipidemia    4. Smoking           Glycemic Management        Diabetes mellitus type 2         using dexcom --     Download and reviewed     Dated from Jan. 19 to Feb. 1, 2022     Average bg 320    In range less than 1 %     High 12%     Very high 87 %     Low 0 %     Very low 0 %     High all the time but not giving mealtime insulin     Give mealtime insulin                He states the last A1c was 8.6%             taking Basaglar 65 units         Taking metformin 1000 mg BID            Glyxambi 10/5 mg once daily         Novolog --taking 16 up to 24 units  TID for each meal            151- 200          2 units   201-250           4 units   251-300           6 units   Above 301       8 units            Goals for sugar     Fasting and before meals 80 to 130     2 hours after meals 180 or less        Aim for 60  grams of carbohydrate per meal     Aim for 15 grams of carbohydrate per snack             stopped  Bydureon 2 mg once weekly - not effective       stopped Trulicity 3 mg once weekly taking ---side effects                      steglatro  15 mg ---no longer covered               Lipid Management     Taking  lipitor 40 mg daily                     Blood Pressure Management        Taking Lisinopril 5 mg daily         Microvascular Complication Monitoring        Last eye exam --- Nov. 2018 , no DR      Wait on eye exam due to hyperglycemia                  Gabapentin -- 600 mg tid --- not effective       Lyrica 100 mg TID --- stopped due side effects      [facility] controlled substance policy discussed.   Mark reviewed and appropriate              Bone Health              Lab Results   Component Value Date      CALCIUM 9.1 04/23/2021            Other Diabetes Related Aspects              Lab Results   Component Value Date     [id number] 466 04/23/2021               Thyroid Health       Lab Results   Component Value Date    TSH 0.636 04/23/2021                Preventive care     Smoker     Jeremy Siu  reports that he has been smoking cigarettes. He has been smoking about 1.00 pack per day. He has never used smokeless tobacco.. I have educated him on the risk of diseases from using tobacco products such as cancer, COPD and heart disease.     I advised him to quit and he is not willing to quit.    I spent 3  minutes counseling the patient.                                        Follow Up   Return in about 3 months (around 5/1/2022) for Recheck.  Patient was given instructions and counseling regarding his condition or for health maintenance advice. Please see specific information pulled into the AVS if appropriate.         This document has been electronically signed by CHUN Kerr on February 1, 2022 10:55 CST.

## 2022-07-11 ENCOUNTER — OFFICE VISIT (OUTPATIENT)
Dept: ENDOCRINOLOGY | Facility: CLINIC | Age: 52
End: 2022-07-11

## 2022-07-11 VITALS
HEART RATE: 127 BPM | DIASTOLIC BLOOD PRESSURE: 74 MMHG | SYSTOLIC BLOOD PRESSURE: 118 MMHG | HEIGHT: 73 IN | OXYGEN SATURATION: 100 % | BODY MASS INDEX: 30.3 KG/M2 | WEIGHT: 228.6 LBS

## 2022-07-11 DIAGNOSIS — E78.2 MIXED HYPERLIPIDEMIA: ICD-10-CM

## 2022-07-11 DIAGNOSIS — I10 PRIMARY HYPERTENSION: ICD-10-CM

## 2022-07-11 DIAGNOSIS — F17.200 SMOKING: ICD-10-CM

## 2022-07-11 DIAGNOSIS — E11.43 DIABETIC AUTONOMIC NEUROPATHY ASSOCIATED WITH TYPE 2 DIABETES MELLITUS: ICD-10-CM

## 2022-07-11 DIAGNOSIS — E11.65 TYPE 2 DIABETES MELLITUS WITH HYPERGLYCEMIA, WITH LONG-TERM CURRENT USE OF INSULIN: Primary | ICD-10-CM

## 2022-07-11 DIAGNOSIS — Z79.4 TYPE 2 DIABETES MELLITUS WITH HYPERGLYCEMIA, WITH LONG-TERM CURRENT USE OF INSULIN: Primary | ICD-10-CM

## 2022-07-11 PROCEDURE — 99214 OFFICE O/P EST MOD 30 MIN: CPT | Performed by: NURSE PRACTITIONER

## 2022-07-11 RX ORDER — ROPINIROLE 0.5 MG/1
0.5 TABLET, FILM COATED ORAL 2 TIMES DAILY
COMMUNITY

## 2022-07-11 RX ORDER — SEMAGLUTIDE 1.34 MG/ML
0.25 INJECTION, SOLUTION SUBCUTANEOUS WEEKLY
Qty: 1 PEN | Refills: 11 | Status: SHIPPED | OUTPATIENT
Start: 2022-07-11 | End: 2023-01-18

## 2022-07-11 NOTE — PROGRESS NOTES
"Chief Complaint  Diabetes    Subjective          Jeremy Siu presents to [facility] ENDOCRINOLOGY  History of Present Illness       In office visit     51 year old male presents for follow up     Diagnosed in 2010     Reason diabetes mellitus type 2     Timing constant      Quality not controlled        Microvascular-- neuropathy, no diabetic retinopathy, no renal disease            Diabetes Regimen-- oral medications, insulin            Blood Glucose Readings     Checks 4 times daily         States high     Uses dexcom but lost transmitter    Should get new today     Review of Systems - General ROS: negative                                  Objective   Vital Signs:   /74   Pulse (!) 127   Ht 185.4 cm (73\")   Wt 104 kg (228 lb 9.6 oz)   SpO2 100%   BMI 30.16 kg/m²     Physical Exam  Constitutional:       Appearance: Normal appearance.   Cardiovascular:      Rate and Rhythm: Regular rhythm.      Heart sounds: Normal heart sounds.   Pulmonary:      Breath sounds: Normal breath sounds.   Musculoskeletal:      Cervical back: Normal range of motion and neck supple.   Neurological:      General: No focal deficit present.      Mental Status: He is alert.   Psychiatric:         Mood and Affect: Mood normal.         Thought Content: Thought content normal.        Result Review :   The following data was reviewed by: CHUN Kerr on 02/01/2022:  Common labs    Common Labsle 2/1/22 2/1/22 2/1/22    1106 1106 1106   Glucose  294 (A)    BUN  16    Creatinine  0.86    eGFR Non African Am  95    Sodium  136    Potassium  4.3    Chloride  100    Calcium  9.6    Albumin  4.40    Total Bilirubin  0.4    Alkaline Phosphatase  105    AST (SGOT)  30    ALT (SGPT)  20    WBC 11.14 (A)     Hemoglobin 18.5 (A)     Hematocrit 50.9     Platelets 185     Hemoglobin A1C   12.42 (A)   (A) Abnormal value       Comments are available for some flowsheets but are not being displayed.    "                    Assessment and Plan    Diagnoses and all orders for this visit:    1. Type 2 diabetes mellitus with hyperglycemia, with long-term current use of insulin (HCC) (Primary)    2. Diabetic autonomic neuropathy associated with type 2 diabetes mellitus (HCC)    3. Smoking    4. Mixed hyperlipidemia    5. Primary hypertension    Other orders  -     Semaglutide,0.25 or 0.5MG/DOS, (Ozempic, 0.25 or 0.5 MG/DOSE,) 2 MG/1.5ML solution pen-injector; Inject 0.25 mg under the skin into the appropriate area as directed 1 (One) Time Per Week.  Dispense: 1 pen; Refill: 11  -     empagliflozin (Jardiance) 10 MG tablet tablet; Take 1 tablet by mouth Daily. Before bkfast  Dispense: 30 tablet; Refill: 11           Glycemic Management        Diabetes mellitus type 2         using dexcom -- but out of transmitter       He states the last A1c was 8.6%             taking Basaglar 65 units ----increase to 70 units         Taking metformin 1000 mg BID            Glyxambi 10/5 mg once daily --no longer covered by insurance       Change to Ozempic once weekly --start  0.25 mg weekly     Change to  farxiga 5 mg daily         Novolog --taking 30 up to 40  units  TID for each meal            151- 200          2 units   201-250           4 units   251-300           6 units   Above 301       8 units            Goals for sugar     Fasting and before meals 80 to 130     2 hours after meals 180 or less        Aim for 60  grams of carbohydrate per meal     Aim for 15 grams of carbohydrate per snack             stopped  Bydureon 2 mg once weekly - not effective       stopped Trulicity 3 mg once weekly taking ---side effects --belching                      steglatro  15 mg ---no longer covered               Lipid Management     Taking  lipitor 40 mg daily                     Blood Pressure Management        Taking Lisinopril 5 mg daily         Microvascular Complication Monitoring        Last eye exam --- Nov. 2018 , no DR Pradhan on  eye exam due to hyperglycemia                  Gabapentin -- 600 mg tid --- not effective       Lyrica 100 mg TID --- stopped due side effects      [facility] controlled substance policy discussed.   Mark reviewed and appropriate              Bone Health              Lab Results   Component Value Date     CALCIUM 9.1 04/23/2021            Other Diabetes Related Aspects              Lab Results   Component Value Date     [id number] 466 04/23/2021               Thyroid Health             Lab Results   Component Value Date    TSH 0.636 04/23/2021                Preventive care     Smoker     Jeremy Siu  reports that he has been smoking cigarettes. He has been smoking about 1.00 pack per day. He has never used smokeless tobacco.. I have educated him on the risk of diseases from using tobacco products such as cancer, COPD and heart disease.     I advised him to quit and he is not willing to quit.    I spent 3  minutes counseling the patient.                                        Follow Up   Return in about 3 months (around 10/11/2022) for Recheck.  Patient was given instructions and counseling regarding his condition or for health maintenance advice. Please see specific information pulled into the AVS if appropriate.         This document has been electronically signed by CHUN Kerr on July 11, 2022 11:57 CDT.

## 2022-07-13 ENCOUNTER — TELEPHONE (OUTPATIENT)
Dept: ENDOCRINOLOGY | Facility: CLINIC | Age: 52
End: 2022-07-13

## 2022-07-13 NOTE — TELEPHONE ENCOUNTER
PATIENTS WIFE CALLED STATING HOLLAND HELTON IS SUPPOSED TO HAVE A LETTER PRINTED FOR HER  TO , SHE SAID IT IS FOR THE [employer] OFFICE. THIS IS ALL OF THE INFORMATION SHE GAVE ME. SHE SAID HE IS COMING TODAY TO PICK IT UP AND HOLLAND WILL KNOW WHAT IT IS ABOUT, THEY SPOKE TO HER ABOUT IT ON 7/11/2022    THANKS

## 2022-07-14 ENCOUNTER — TELEPHONE (OUTPATIENT)
Dept: NEUROLOGY | Age: 52
End: 2022-07-14

## 2022-07-18 ENCOUNTER — TELEPHONE (OUTPATIENT)
Dept: NEUROLOGY | Age: 52
End: 2022-07-18

## 2022-07-18 NOTE — TELEPHONE ENCOUNTER
Received a referral for this patient. Tried calling patient to get him scheduled an appointment with Dr. Jackqueline Boeck. NO answer. Left a VM for patient to call our office back.

## 2022-08-10 RX ORDER — EMPAGLIFLOZIN AND LINAGLIPTIN 10; 5 MG/1; MG/1
1 TABLET, FILM COATED ORAL DAILY
Qty: 30 TABLET | Refills: 11 | Status: SHIPPED | OUTPATIENT
Start: 2022-08-10

## 2022-10-17 ENCOUNTER — TELEMEDICINE (OUTPATIENT)
Dept: ENDOCRINOLOGY | Facility: CLINIC | Age: 52
End: 2022-10-17

## 2022-10-17 DIAGNOSIS — Z79.4 TYPE 2 DIABETES MELLITUS WITH HYPOGLYCEMIA WITHOUT COMA, WITH LONG-TERM CURRENT USE OF INSULIN: Primary | ICD-10-CM

## 2022-10-17 DIAGNOSIS — E11.649 TYPE 2 DIABETES MELLITUS WITH HYPOGLYCEMIA WITHOUT COMA, WITH LONG-TERM CURRENT USE OF INSULIN: Primary | ICD-10-CM

## 2022-10-17 DIAGNOSIS — F17.200 SMOKER: ICD-10-CM

## 2022-10-17 DIAGNOSIS — E11.42 DIABETIC POLYNEUROPATHY ASSOCIATED WITH TYPE 2 DIABETES MELLITUS: ICD-10-CM

## 2022-10-17 DIAGNOSIS — E78.2 MIXED HYPERLIPIDEMIA: ICD-10-CM

## 2022-10-17 DIAGNOSIS — I10 PRIMARY HYPERTENSION: ICD-10-CM

## 2022-10-17 PROCEDURE — 99214 OFFICE O/P EST MOD 30 MIN: CPT | Performed by: NURSE PRACTITIONER

## 2022-10-17 RX ORDER — TIRZEPATIDE 2.5 MG/.5ML
2.5 INJECTION, SOLUTION SUBCUTANEOUS WEEKLY
Qty: 2 ML | Refills: 11 | Status: SHIPPED | OUTPATIENT
Start: 2022-10-17 | End: 2023-01-18 | Stop reason: SDUPTHER

## 2022-10-17 NOTE — PROGRESS NOTES
Chief Complaint  Diabetes    Subjective          Jeremy Siu presents to [facility] ENDOCRINOLOGY  Diabetes         You have chosen to receive care through a telehealth visit.  Do you consent to use a video/audio connection for your medical care today? Yes            TELEHEALTH VIDEO VISIT     This a video visit due to [facility] current guidelines for social distancing due to the COVID 19 pandemic        Mode of Visit: Video  Location of patient: home  You have chosen to receive care through a telehealth visit.  Does the patient consent to use a video/audio connection for your medical care today? Yes  The visit included audio and video interaction. No technical issues occurred during this visit.             51 year old male presents for follow up     Diagnosed in 2010     Reason diabetes mellitus type 2     Timing constant      Quality not controlled  Microvascular-- neuropathy, no diabetic retinopathy, no renal disease            Diabetes Regimen-- oral medications, insulin            Blood Glucose Readings     Checks 4 times daily      Was having lows in the 40s , he decreased his novolog and has not had the low since       Review of Systems - General ROS: negative            Objective   Vital Signs:   There were no vitals taken for this visit.    Physical Exam  Pulmonary:      Breath sounds: Normal breath sounds.   Neurological:      General: No focal deficit present.      Mental Status: He is alert.   Psychiatric:         Mood and Affect: Mood normal.         Thought Content: Thought content normal.        Result Review :   The following data was reviewed by: CHUN Kerr on 02/01/2022:  Common labs    Common Labs 2/1/22 2/1/22 2/1/22    1106 1106 1106   Glucose  294 (A)    BUN  16    Creatinine  0.86    eGFR Non African Am  95    Sodium  136    Potassium  4.3    Chloride  100    Calcium  9.6    Albumin  4.40    Total Bilirubin  0.4    Alkaline Phosphatase  105    AST  (SGOT)  30    ALT (SGPT)  20    WBC 11.14 (A)     Hemoglobin 18.5 (A)     Hematocrit 50.9     Platelets 185     Hemoglobin A1C   12.42 (A)   (A) Abnormal value       Comments are available for some flowsheets but are not being displayed.                       Assessment and Plan    Diagnoses and all orders for this visit:    1. Type 2 diabetes mellitus with hypoglycemia without coma, with long-term current use of insulin (HCC) (Primary)    2. Diabetic polyneuropathy associated with type 2 diabetes mellitus (HCC)    3. Primary hypertension    4. Mixed hyperlipidemia    5. Smoker    Other orders  -     Tirzepatide (Mounjaro) 2.5 MG/0.5ML solution pen-injector; Inject 2.5 mg under the skin into the appropriate area as directed 1 (One) Time Per Week.  Dispense: 2 mL; Refill: 11           Glycemic Management        Diabetes mellitus type 2        Using dexcom could not download       He states the last A1c was 8.6%             taking Basaglar 65 units         Taking metformin 1000 mg BID            Glyxambi 10/5 mg once daily --no longer covered by insurance     Ozempic  0.25 mg weekly once weekly --- having side effects , sour stomach , belching     Change to Mounjaro 2.5 mg once weekly        taking jardiance 10 mg daily             Novolog --taking 30 units     He was having lows when giving 40 units            151- 200          2 units   201-250           4 units   251-300           6 units   Above 301       8 units            Goals for sugar     Fasting and before meals 80 to 130     2 hours after meals 180 or less        Aim for 60  grams of carbohydrate per meal     Aim for 15 grams of carbohydrate per snack             stopped  Bydureon 2 mg once weekly - not effective       stopped Trulicity 3 mg once weekly taking ---side effects --belching                      steglatro  15 mg ---no longer covered               Lipid Management     Taking  lipitor 40 mg daily                     Blood Pressure  Management        Taking Lisinopril 5 mg daily         Microvascular Complication Monitoring        Last eye exam --- Nov. 2018 , no DR      Wait on eye exam due to hyperglycemia                  Gabapentin -- 600 mg tid --- not effective       Lyrica 100 mg TID --- stopped due side effects      [facility] controlled substance policy discussed.   Mark reviewed and appropriate              Bone Health              Lab Results   Component Value Date     CALCIUM 9.1 04/23/2021            Other Diabetes Related Aspects              Lab Results   Component Value Date     [id number] 466 04/23/2021               Thyroid Health             Lab Results   Component Value Date    TSH 0.636 04/23/2021                Preventive care     Smoker     Jeremy Siu  reports that he has been smoking cigarettes. He has been smoking an average of 1 pack per day. He has never used smokeless tobacco.. I have educated him on the risk of diseases from using tobacco products such as cancer, COPD and heart disease.     I advised him to quit and he is not willing to quit.    I spent 3  minutes counseling the patient.                                        Follow Up   No follow-ups on file.  Patient was given instructions and counseling regarding his condition or for health maintenance advice. Please see specific information pulled into the AVS if appropriate.         This document has been electronically signed by CHUN Kerr on October 17, 2022 11:57 CDT.

## 2022-11-16 ENCOUNTER — TRANSCRIBE ORDERS (OUTPATIENT)
Dept: GENERAL RADIOLOGY | Facility: CLINIC | Age: 52
End: 2022-11-16

## 2022-11-16 DIAGNOSIS — M54.2 CERVICALGIA: Primary | ICD-10-CM

## 2022-12-13 RX ORDER — PROCHLORPERAZINE 25 MG/1
SUPPOSITORY RECTAL
Qty: 9 EACH | Refills: 3 | Status: SHIPPED | OUTPATIENT
Start: 2022-12-13

## 2022-12-14 ENCOUNTER — DOCUMENTATION (OUTPATIENT)
Dept: ENDOCRINOLOGY | Facility: CLINIC | Age: 52
End: 2022-12-14

## 2023-01-18 ENCOUNTER — TELEMEDICINE (OUTPATIENT)
Dept: ENDOCRINOLOGY | Facility: CLINIC | Age: 53
End: 2023-01-18
Payer: COMMERCIAL

## 2023-01-18 DIAGNOSIS — E11.65 TYPE 2 DIABETES MELLITUS WITH HYPERGLYCEMIA, WITH LONG-TERM CURRENT USE OF INSULIN: Primary | ICD-10-CM

## 2023-01-18 DIAGNOSIS — E78.2 MIXED HYPERLIPIDEMIA: ICD-10-CM

## 2023-01-18 DIAGNOSIS — E11.42 DIABETIC POLYNEUROPATHY ASSOCIATED WITH TYPE 2 DIABETES MELLITUS: ICD-10-CM

## 2023-01-18 DIAGNOSIS — Z79.4 TYPE 2 DIABETES MELLITUS WITH HYPERGLYCEMIA, WITH LONG-TERM CURRENT USE OF INSULIN: Primary | ICD-10-CM

## 2023-01-18 DIAGNOSIS — F17.200 SMOKER: ICD-10-CM

## 2023-01-18 DIAGNOSIS — I10 PRIMARY HYPERTENSION: ICD-10-CM

## 2023-01-18 PROCEDURE — 99214 OFFICE O/P EST MOD 30 MIN: CPT | Performed by: NURSE PRACTITIONER

## 2023-01-18 RX ORDER — TIRZEPATIDE 2.5 MG/.5ML
2.5 INJECTION, SOLUTION SUBCUTANEOUS WEEKLY
Qty: 2 ML | Refills: 11 | Status: SHIPPED | OUTPATIENT
Start: 2023-01-18 | End: 2023-01-18 | Stop reason: SDUPTHER

## 2023-01-18 RX ORDER — INSULIN ASPART 100 [IU]/ML
40 INJECTION, SOLUTION INTRAVENOUS; SUBCUTANEOUS
Qty: 27 ML | Refills: 11 | Status: SHIPPED | OUTPATIENT
Start: 2023-01-18

## 2023-01-18 RX ORDER — TIRZEPATIDE 2.5 MG/.5ML
2.5 INJECTION, SOLUTION SUBCUTANEOUS WEEKLY
Qty: 2 ML | Refills: 11 | Status: SHIPPED | OUTPATIENT
Start: 2023-01-18

## 2023-01-18 NOTE — TELEPHONE ENCOUNTER
AKBAR FROM [facility] CALLED REQUESTING NEW RX TO BE SENT FOR MOUNJARO WITH THE DX CODE.    RX SENT.

## 2023-01-18 NOTE — PROGRESS NOTES
Chief Complaint  No chief complaint on file.    Subjective          Jeremy Siu presents to [facility] ENDOCRINOLOG[facility]  Diabetes         You have chosen to receive care through a telehealth visit.  Do you consent to use a video/audio connection for your medical care today? Yes            TELEHEALTH VIDEO VISIT     This a video visit due to [facility] current guidelines for social distancing due to the COVID 19 pandemic        Mode of Visit: Video  Location of patient: home  You have chosen to receive care through a telehealth visit.  Does the patient consent to use a video/audio connection for your medical care today? Yes  The visit included audio and video interaction. No technical issues occurred during this visit.             52 year old male presents for follow up     Reason diabetes mellitus type 2       Diagnosed in 2010       Timing constant      Quality not controlled      Microvascular-- neuropathy, no diabetic retinopathy, no renal disease            Diabetes Regimen-- oral medications, insulin            Blood Glucose Readings     Checks 4 times daily      Using dexcom G6     States under 250       No  Lows       Review of Systems - General ROS: negative            Objective   Vital Signs:   There were no vitals taken for this visit.    Physical Exam  Pulmonary:      Breath sounds: Normal breath sounds.   Neurological:      General: No focal deficit present.      Mental Status: He is alert.   Psychiatric:         Mood and Affect: Mood normal.         Thought Content: Thought content normal.        Result Review :   The following data was reviewed by: CHUN Kerr on 02/01/2022:  Common labs    Common Labs 2/1/22 2/1/22 2/1/22    1106 1106 1106   Glucose  294 (A)    BUN  16    Creatinine  0.86    eGFR Non African Am  95    Sodium  136    Potassium  4.3    Chloride  100    Calcium  9.6    Albumin  4.40    Total Bilirubin  0.4    Alkaline Phosphatase  105    AST  (SGOT)  30    ALT (SGPT)  20    WBC 11.14 (A)     Hemoglobin 18.5 (A)     Hematocrit 50.9     Platelets 185     Hemoglobin A1C   12.42 (A)   (A) Abnormal value       Comments are available for some flowsheets but are not being displayed.                       Assessment and Plan    Diagnoses and all orders for this visit:    1. Type 2 diabetes mellitus with hyperglycemia, with long-term current use of insulin (HCC) (Primary)    2. Mixed hyperlipidemia    3. Primary hypertension    4. Diabetic polyneuropathy associated with type 2 diabetes mellitus (HCC)    5. Smoker    Other orders  -     Tirzepatide (Mounjaro) 2.5 MG/0.5ML solution pen-injector; Inject 2.5 mg under the skin into the appropriate area as directed 1 (One) Time Per Week.  Dispense: 2 mL; Refill: 11  -     insulin aspart (NovoLOG FlexPen) 100 UNIT/ML solution pen-injector sc pen; Inject 40 Units under the skin into the appropriate area as directed 3 (Three) Times a Day With Meals.  Dispense: 27 mL; Refill: 11           Glycemic Management        Diabetes mellitus type 2        Using dexcom but could not download       He states the last A1c was 8.6%             taking Basaglar 65 units --- increase to 68 units         Taking metformin 1000 mg BID            Glyxambi 10/5 mg once daily --no longer covered by insurance     Ozempic  0.25 mg weekly once weekly --- having side effects , sour stomach , belching --cannot tolerate          Called   Mounjaro 2.5 mg once weekly to try since less GI        taking jardiance 10 mg daily             Novolog --taking 35 units with each meal TID           151- 200          2 units   201-250           4 units   251-300           6 units   Above 301       8 units            Goals for sugar     Fasting and before meals 80 to 130     2 hours after meals 180 or less        Aim for 60  grams of carbohydrate per meal     Aim for 15 grams of carbohydrate per snack             stopped  Bydureon 2 mg once weekly - not  effective       stopped Trulicity 3 mg once weekly taking ---side effects --belching                      steglatro  15 mg ---no longer covered               Lipid Management     Taking  lipitor 40 mg daily                     Blood Pressure Management        Taking Lisinopril 5 mg daily         Microvascular Complication Monitoring        Last eye exam --- Nov. 2018 , no DR      Wait on eye exam due to hyperglycemia                  Gabapentin -- 600 mg tid --- not effective       Lyrica 100 mg TID --- stopped due side effects      [facility] controlled substance policy discussed.   Mark reviewed and appropriate              Bone Health              Lab Results   Component Value Date     CALCIUM 9.1 04/23/2021            Other Diabetes Related Aspects              Lab Results   Component Value Date     [id number] 466 04/23/2021               Thyroid Health             Lab Results   Component Value Date    TSH 0.636 04/23/2021                Preventive care     Smoker      Jeremy Siu  reports that he has been smoking cigarettes. He has been smoking an average of 1 pack per day. He has never used smokeless tobacco.. I have educated him on the risk of diseases from using tobacco products such as cancer and COPD.     I advised him to quit and he is not willing to quit.    I spent 3  minutes counseling the patient.                        he will do labs with primary and have them faxed to us                   Follow Up   No follow-ups on file.  Patient was given instructions and counseling regarding his condition or for health maintenance advice. Please see specific information pulled into the AVS if appropriate.         This document has been electronically signed by CHUN Kerr on January 18, 2023 08:23 CST.

## 2023-04-18 RX ORDER — PROCHLORPERAZINE 25 MG/1
SUPPOSITORY RECTAL
Qty: 1 EACH | Refills: 3 | Status: SHIPPED | OUTPATIENT
Start: 2023-04-18

## 2023-05-02 ENCOUNTER — TELEMEDICINE (OUTPATIENT)
Dept: ENDOCRINOLOGY | Facility: CLINIC | Age: 53
End: 2023-05-02
Payer: COMMERCIAL

## 2023-05-02 ENCOUNTER — TELEPHONE (OUTPATIENT)
Dept: ENDOCRINOLOGY | Facility: CLINIC | Age: 53
End: 2023-05-02

## 2023-05-02 DIAGNOSIS — E11.42 DIABETIC POLYNEUROPATHY ASSOCIATED WITH TYPE 2 DIABETES MELLITUS: ICD-10-CM

## 2023-05-02 DIAGNOSIS — F17.200 SMOKING: ICD-10-CM

## 2023-05-02 DIAGNOSIS — I10 PRIMARY HYPERTENSION: ICD-10-CM

## 2023-05-02 DIAGNOSIS — Z79.4 TYPE 2 DIABETES MELLITUS WITH HYPERGLYCEMIA, WITH LONG-TERM CURRENT USE OF INSULIN: Primary | ICD-10-CM

## 2023-05-02 DIAGNOSIS — E11.65 TYPE 2 DIABETES MELLITUS WITH HYPERGLYCEMIA, WITH LONG-TERM CURRENT USE OF INSULIN: Primary | ICD-10-CM

## 2023-05-02 DIAGNOSIS — E78.2 MIXED HYPERLIPIDEMIA: ICD-10-CM

## 2023-05-02 RX ORDER — PEN NEEDLE, DIABETIC 32GX 5/32"
NEEDLE, DISPOSABLE MISCELLANEOUS
Qty: 150 EACH | Refills: 11 | Status: SHIPPED | OUTPATIENT
Start: 2023-05-02

## 2023-05-02 NOTE — PROGRESS NOTES
Chief Complaint  Diabetes       Subjective          Jeremy Siu presents to [facility] ENDOCRINOLOG[facility]  Diabetes         You have chosen to receive care through a telehealth visit.  Do you consent to use a video/audio connection for your medical care today? Yes            TELEHEALTH VIDEO VISIT     This a video visit due to [facility] current guidelines for social distancing due to the COVID 19 pandemic        Mode of Visit: Video  Location of patient: home  You have chosen to receive care through a telehealth visit.  Does the patient consent to use a video/audio connection for your medical care today? Yes  The visit included audio and video interaction. No technical issues occurred during this visit.         52 year old male presents for follow up     Diabetes mellitus type 2     Duration diagnosed in 2010     Timing constant     Quality not controlled     Severity high       Complications neuropathy       Current diabetes regimen     SGLT-2, GLP-1, and insulin       Current glucose readings    Checks 4 times daily     Using dexcom G6        Using dexcom G6     Evening is around 110 up to 120     Daytime up to 250    No low  Sugars         Review of Systems - General ROS: negative          Objective   Vital Signs:   There were no vitals taken for this visit.    Physical Exam  Pulmonary:      Breath sounds: Normal breath sounds.   Neurological:      General: No focal deficit present.      Mental Status: He is alert.   Psychiatric:         Mood and Affect: Mood normal.         Thought Content: Thought content normal.        Result Review :   The following data was reviewed by: CHUN Kerr on 02/01/2022:                Assessment and Plan    Diagnoses and all orders for this visit:    1. Type 2 diabetes mellitus with hyperglycemia, with long-term current use of insulin (Primary)    2. Diabetic polyneuropathy associated with type 2 diabetes mellitus    3. Mixed  hyperlipidemia    4. Primary hypertension    5. Smoking    Other orders  -     Insulin Pen Needle (GoodSense Pen Needle Penfine) 32G X 4 MM misc; Inject 5 times daily  Dispense: 150 each; Refill: 11           Glycemic Management        Diabetes mellitus type 2        Using dexcom but could not download       He states the last A1c was 8.6%             taking Basaglar 70  units         Taking metformin 1000 mg BID              Taking    Mounjaro 2.5 mg once weekly        taking jardiance 10 mg daily             Novolog --taking 40  units with each meal TID           151- 200          2 units   201-250           4 units   251-300           6 units   Above 301       8 units                  Goals for sugar     Fasting and before meals 80 to 130     2 hours after meals 180 or less        Aim for 60  grams of carbohydrate per meal     Aim for 15 grams of carbohydrate per snack        He swears he does not eat bad, I have asked Tristan to schedule with him to help look at diet         stopped  Bydureon 2 mg once weekly - not effective       stopped Trulicity 3 mg once weekly taking ---side effects --belching                      steglatro  15 mg ---no longer covered               Lipid Management     Taking  lipitor 40 mg daily                     Blood Pressure Management        Taking Lisinopril 5 mg daily         Microvascular Complication Monitoring        Last eye exam --- Nov. 2022 , no DR                Gabapentin -- 600 mg tid --- not effective       Lyrica 100 mg TID --- stopped due side effects                   Bone Health              Lab Results   Component Value Date     CALCIUM 9.1 04/23/2021            Other Diabetes Related Aspects              Lab Results   Component Value Date     [id number] 466 04/23/2021               Thyroid Health             Lab Results   Component Value Date    TSH 0.636 04/23/2021                Preventive care     Smoker      Jeremy Siu  reports that he has been smoking  cigarettes. He has been smoking an average of 1 pack per day. He has never used smokeless tobacco.. I have educated him on the risk of diseases from using tobacco products such as cancer and COPD.     I advised him to quit and he is not willing to quit.    I spent 3  minutes counseling the patient.                       Follow Up   No follow-ups on file.  Patient was given instructions and counseling regarding his condition or for health maintenance advice. Please see specific information pulled into the AVS if appropriate.         This document has been electronically signed by CHUN Kerr on May 2, 2023 08:14 CDT.

## 2023-08-10 ENCOUNTER — TELEMEDICINE (OUTPATIENT)
Dept: ENDOCRINOLOGY | Facility: CLINIC | Age: 53
End: 2023-08-10
Payer: COMMERCIAL

## 2023-08-10 DIAGNOSIS — F17.200 SMOKER: ICD-10-CM

## 2023-08-10 DIAGNOSIS — E11.43 DIABETIC AUTONOMIC NEUROPATHY ASSOCIATED WITH TYPE 2 DIABETES MELLITUS: ICD-10-CM

## 2023-08-10 DIAGNOSIS — E11.65 TYPE 2 DIABETES MELLITUS WITH HYPERGLYCEMIA, WITH LONG-TERM CURRENT USE OF INSULIN: Primary | ICD-10-CM

## 2023-08-10 DIAGNOSIS — I10 PRIMARY HYPERTENSION: ICD-10-CM

## 2023-08-10 DIAGNOSIS — E78.2 MIXED HYPERLIPIDEMIA: ICD-10-CM

## 2023-08-10 DIAGNOSIS — Z79.4 TYPE 2 DIABETES MELLITUS WITH HYPERGLYCEMIA, WITH LONG-TERM CURRENT USE OF INSULIN: Primary | ICD-10-CM

## 2023-08-10 DIAGNOSIS — E55.9 VITAMIN D DEFICIENCY: ICD-10-CM

## 2023-08-10 RX ORDER — INSULIN GLARGINE 100 [IU]/ML
75 INJECTION, SOLUTION SUBCUTANEOUS NIGHTLY
Qty: 30 ML | Refills: 11 | Status: SHIPPED | OUTPATIENT
Start: 2023-08-10

## 2023-08-10 RX ORDER — PROCHLORPERAZINE 25 MG/1
1 SUPPOSITORY RECTAL
Qty: 1 EACH | Refills: 3 | Status: SHIPPED | OUTPATIENT
Start: 2023-08-10

## 2023-08-10 RX ORDER — INSULIN ASPART 100 [IU]/ML
45 INJECTION, SOLUTION INTRAVENOUS; SUBCUTANEOUS
Qty: 30 ML | Refills: 11 | Status: SHIPPED | OUTPATIENT
Start: 2023-08-10

## 2023-08-10 RX ORDER — TIRZEPATIDE 5 MG/.5ML
5 INJECTION, SOLUTION SUBCUTANEOUS
Qty: 2 ML | Refills: 11 | Status: SHIPPED | OUTPATIENT
Start: 2023-08-10

## 2023-08-10 RX ORDER — PROCHLORPERAZINE 25 MG/1
SUPPOSITORY RECTAL
Qty: 9 EACH | Refills: 3 | Status: SHIPPED | OUTPATIENT
Start: 2023-08-10

## 2023-08-10 NOTE — PROGRESS NOTES
Chief Complaint  Diabetes       Subjective          Jeremy Siu presents to [facility] ENDOCRINOLOGY  Diabetes       You have chosen to receive care through a telehealth visit.  Do you consent to use a video/audio connection for your medical care today? Yes            TELEHEALTH VIDEO VISIT     This a video visit due to [facility] current guidelines for social distancing due to the COVID 19 pandemic        Mode of Visit: Video  Location of patient: home  You have chosen to receive care through a telehealth visit.  Does the patient consent to use a video/audio connection for your medical care today? Yes  The visit included audio and video interaction. No technical issues occurred during this visit.         52 year old male presents for follow up     Reason diabetes type 2     Diagnosed in 2010     Timing constant    Quality not controlled     Severity  high           Complications neuropathy       Current diabetes regimen     SGLT-2, GLP-1, and insulin       Current glucose readings    Checks 4 times daily     Using dexcom G6        Using dexcom G6     Am is up to 220    Daytime 76 up to 195         Review of Systems - General ROS: negative          Objective   Vital Signs:   There were no vitals taken for this visit.    Physical Exam  Pulmonary:      Breath sounds: Normal breath sounds.   Neurological:      General: No focal deficit present.      Mental Status: He is alert.   Psychiatric:         Mood and Affect: Mood normal.         Thought Content: Thought content normal.      Result Review :   The following data was reviewed by: CHUN Kerr on 02/01/2022:              Assessment and Plan    Diagnoses and all orders for this visit:    1. Type 2 diabetes mellitus with hyperglycemia, with long-term current use of insulin (Primary)  -     CBC & Differential  -     Comprehensive Metabolic Panel  -     Hemoglobin A1c  -     Lipid Panel  -     Microalbumin / Creatinine Urine  Ratio - Urine, Clean Catch  -     TSH  -     Vitamin B12  -     Vitamin D,25-Hydroxy    2. Diabetic autonomic neuropathy associated with type 2 diabetes mellitus  -     CBC & Differential  -     Comprehensive Metabolic Panel  -     Hemoglobin A1c  -     Lipid Panel  -     Microalbumin / Creatinine Urine Ratio - Urine, Clean Catch  -     TSH  -     Vitamin B12  -     Vitamin D,25-Hydroxy    3. Mixed hyperlipidemia  -     CBC & Differential  -     Comprehensive Metabolic Panel  -     Hemoglobin A1c  -     Lipid Panel  -     Microalbumin / Creatinine Urine Ratio - Urine, Clean Catch  -     TSH  -     Vitamin B12  -     Vitamin D,25-Hydroxy    4. Primary hypertension  -     CBC & Differential  -     Comprehensive Metabolic Panel  -     Hemoglobin A1c  -     Lipid Panel  -     Microalbumin / Creatinine Urine Ratio - Urine, Clean Catch  -     TSH  -     Vitamin B12  -     Vitamin D,25-Hydroxy    5. Vitamin D deficiency  -     CBC & Differential  -     Comprehensive Metabolic Panel  -     Hemoglobin A1c  -     Lipid Panel  -     Microalbumin / Creatinine Urine Ratio - Urine, Clean Catch  -     TSH  -     Vitamin B12  -     Vitamin D,25-Hydroxy    6. Smoker    Other orders  -     Continuous Blood Gluc Sensor (Dexcom G6 Sensor); Inject  under the skin into the appropriate area as directed Every 10 (Ten) Days.  Dispense: 9 each; Refill: 3  -     Continuous Blood Gluc Transmit (Dexcom G6 Transmitter) misc; Inject 1 each under the skin into the appropriate area as directed Every 3 (Three) Months.  Dispense: 1 each; Refill: 3  -     empagliflozin (Jardiance) 10 MG tablet tablet; Take 1 tablet by mouth Daily. Before bkfast  Dispense: 30 tablet; Refill: 11  -     insulin aspart (NovoLOG FlexPen) 100 UNIT/ML solution pen-injector sc pen; Inject 45 Units under the skin into the appropriate area as directed 3 (Three) Times a Day With Meals.  Dispense: 30 mL; Refill: 11  -     Insulin Glargine (BASAGLAR KWIKPEN) 100 UNIT/ML injection  pen; Inject 75 Units under the skin into the appropriate area as directed Every Night.  Dispense: 30 mL; Refill: 11  -     Tirzepatide (Mounjaro) 5 MG/0.5ML solution pen-injector; Inject 0.5 mL under the skin into the appropriate area as directed Every 7 (Seven) Days. 5 mg subcutaneously weekly  Dispense: 2 mL; Refill: 11  -     metFORMIN (GLUCOPHAGE) 1000 MG tablet; Take 1 tablet by mouth 2 (Two) Times a Day With Meals for 30 days.  Dispense: 60 tablet; Refill: 5           Glycemic Management        Diabetes mellitus type 2        Using dexcom but could not download       He states the last A1c was 8.6%             taking Basaglar 70  units --increase to 75         Taking metformin 1000 mg BID              Taking    Mounjaro 2.5 mg once weekly --increase to 5 mg        taking jardiance 10 mg daily             Novolog --taking 40  units with each meal TID --increase to 45           151- 200          2 units   201-250           4 units   251-300           6 units   Above 301       8 units                  Goals for sugar     Fasting and before meals 80 to 130     2 hours after meals 180 or less        Aim for 60  grams of carbohydrate per meal     Aim for 15 grams of carbohydrate per snack               stopped  Bydureon 2 mg once weekly - not effective       stopped Trulicity 3 mg once weekly taking ---side effects --belching                      steglatro  15 mg ---no longer covered               Lipid Management     Taking  lipitor 40 mg daily                     Blood Pressure Management        Taking Lisinopril 5 mg daily         Microvascular Complication Monitoring        Last eye exam --- Nov. 2022 , no                 Gabapentin -- 600 mg tid --- not effective       Lyrica 100 mg TID --- stopped due side effects                   Bone Health              Lab Results   Component Value Date     CALCIUM 9.1 04/23/2021       vitamin d def    Taking vitamin d      Other Diabetes Related Aspects              Lab  Results   Component Value Date     [id number] 466 04/23/2021               Thyroid Health             Lab Results   Component Value Date    TSH 0.636 04/23/2021                Preventive care     Smoker      Jeremy Siu  reports that he has been smoking cigarettes. He has been smoking an average of 1 pack per day. He has never used smokeless tobacco.. I have educated him on the risk of diseases from using tobacco products such as cancer and COPD.     I advised him to quit and he is not willing to quit.    I spent 3  minutes counseling the patient.                       Follow Up   No follow-ups on file.  Patient was given instructions and counseling regarding his condition or for health maintenance advice. Please see specific information pulled into the AVS if appropriate.         This document has been electronically signed by CHUN Kerr on August 10, 2023 10:59 CDT.

## 2023-08-11 ENCOUNTER — TELEPHONE (OUTPATIENT)
Dept: ENDOCRINOLOGY | Facility: CLINIC | Age: 53
End: 2023-08-11
Payer: COMMERCIAL

## 2023-08-11 DIAGNOSIS — E11.65 TYPE 2 DIABETES MELLITUS WITH HYPERGLYCEMIA, WITH LONG-TERM CURRENT USE OF INSULIN: Primary | ICD-10-CM

## 2023-08-11 DIAGNOSIS — Z79.4 TYPE 2 DIABETES MELLITUS WITH HYPERGLYCEMIA, WITH LONG-TERM CURRENT USE OF INSULIN: Primary | ICD-10-CM

## 2023-08-11 RX ORDER — LANCETS 30 GAUGE
1 EACH MISCELLANEOUS 4 TIMES DAILY
Qty: 120 EACH | Refills: 3 | Status: SHIPPED | OUTPATIENT
Start: 2023-08-11

## 2023-08-11 NOTE — TELEPHONE ENCOUNTER
Pt called stating his RX for his lancets have . He uses [facility] in [facility]